# Patient Record
Sex: MALE | Race: BLACK OR AFRICAN AMERICAN | Employment: OTHER | URBAN - METROPOLITAN AREA
[De-identification: names, ages, dates, MRNs, and addresses within clinical notes are randomized per-mention and may not be internally consistent; named-entity substitution may affect disease eponyms.]

---

## 2017-05-01 ENCOUNTER — HOSPITAL ENCOUNTER (EMERGENCY)
Age: 47
Discharge: HOME | End: 2017-05-01
Attending: EMERGENCY MEDICINE | Admitting: EMERGENCY MEDICINE
Payer: MEDICAID

## 2017-05-01 ENCOUNTER — APPOINTMENT (OUTPATIENT)
Dept: GENERAL RADIOLOGY | Age: 47
End: 2017-05-01
Attending: EMERGENCY MEDICINE
Payer: MEDICAID

## 2017-05-01 VITALS
SYSTOLIC BLOOD PRESSURE: 147 MMHG | DIASTOLIC BLOOD PRESSURE: 99 MMHG | HEIGHT: 69 IN | HEART RATE: 84 BPM | BODY MASS INDEX: 23.85 KG/M2 | WEIGHT: 161 LBS | TEMPERATURE: 98.1 F | RESPIRATION RATE: 16 BRPM | OXYGEN SATURATION: 98 %

## 2017-05-01 DIAGNOSIS — S86.819A SPRAIN AND STRAIN OF OTHER SPECIFIED SITES OF KNEE AND LEG: Primary | ICD-10-CM

## 2017-05-01 DIAGNOSIS — S83.8X9A SPRAIN AND STRAIN OF OTHER SPECIFIED SITES OF KNEE AND LEG: Primary | ICD-10-CM

## 2017-05-01 PROCEDURE — 73562 X-RAY EXAM OF KNEE 3: CPT

## 2017-05-01 PROCEDURE — 99283 EMERGENCY DEPT VISIT LOW MDM: CPT

## 2017-05-01 PROCEDURE — L1830 KO IMMOB CANVAS LONG PRE OTS: HCPCS

## 2017-05-01 PROCEDURE — 74011250637 HC RX REV CODE- 250/637: Performed by: EMERGENCY MEDICINE

## 2017-05-01 RX ORDER — HYDROCODONE BITARTRATE AND ACETAMINOPHEN 5; 325 MG/1; MG/1
1 TABLET ORAL
Qty: 12 TAB | Refills: 0 | Status: SHIPPED | OUTPATIENT
Start: 2017-05-01 | End: 2017-06-02

## 2017-05-01 RX ORDER — HYDROCODONE BITARTRATE AND ACETAMINOPHEN 5; 325 MG/1; MG/1
1 TABLET ORAL
Status: COMPLETED | OUTPATIENT
Start: 2017-05-01 | End: 2017-05-01

## 2017-05-01 RX ADMIN — HYDROCODONE BITARTRATE AND ACETAMINOPHEN 1 TABLET: 5; 325 TABLET ORAL at 22:20

## 2017-05-02 NOTE — ED NOTES
Knee immobilizer applied and pt instructed on the use of crutch walking. Pt demonstrated correct use of crutch walking via teach back method. Discharge instructions given to patient by MD and nurse. Pt has been given counseling on medication use and verbalizes understanding. Pt ambulated off of unit in no signs of distress.

## 2017-05-02 NOTE — ED PROVIDER NOTES
HPI Comments: 44-year-old male presents to emergency department for evaluation of right knee pain. Patient states this morning he stood up to get out of the car. His right foot was planted and he turned to the left causing pain in his right knee. Patient has a history of knee injury as a youngster. Patient tried ibuprofen with no relief. Pain is made worse with walking and moving his knee. Pain is rated 8/10. Pain radiates up into the back of the leg. Pain described as sharp. No numbness or tingling, loss of sensation or weakness in the leg. No ankle pain or foot pain. No back pain. No alleviating factors. Social hx  +smoker  Occasional alcohol    Patient is a 41 y.o. male presenting with knee injury. The history is provided by the patient. Knee Injury    Pertinent negatives include no back pain and no neck pain. Past Medical History:   Diagnosis Date    Back pain        Past Surgical History:   Procedure Laterality Date    HX OTHER SURGICAL      Nasal bridge fracture         Family History:   Problem Relation Age of Onset    Diabetes Mother        Social History     Social History    Marital status: SINGLE     Spouse name: N/A    Number of children: N/A    Years of education: N/A     Occupational History    Not on file. Social History Main Topics    Smoking status: Current Every Day Smoker     Packs/day: 0.50    Smokeless tobacco: Never Used    Alcohol use 0.0 oz/week     0 Standard drinks or equivalent per week      Comment: Rarely    Drug use: No    Sexual activity: No     Other Topics Concern    Not on file     Social History Narrative         ALLERGIES: Tramadol and Tylenol [acetaminophen]    Review of Systems   Constitutional: Negative for chills and fever. Respiratory: Negative for cough, chest tightness and shortness of breath. Cardiovascular: Negative for chest pain. Gastrointestinal: Negative for abdominal pain, nausea and vomiting.    Musculoskeletal: Negative for back pain, neck pain and neck stiffness. Knee pain   Skin: Negative for color change and rash. Neurological: Negative for dizziness, weakness and headaches. Vitals:    05/01/17 2124 05/01/17 2301   BP: 130/83 (!) 147/99   Pulse: 92 84   Resp: 18 16   Temp: 98.6 °F (37 °C) 98.1 °F (36.7 °C)   SpO2: 99% 98%   Weight: 73 kg (161 lb)    Height: 5' 9\" (1.753 m)             Physical Exam   Constitutional: He is oriented to person, place, and time. He appears well-developed and well-nourished. HENT:   Head: Normocephalic and atraumatic. Eyes: Conjunctivae and EOM are normal. Pupils are equal, round, and reactive to light. Neck: Normal range of motion. Neck supple. Cardiovascular: Normal rate and regular rhythm. Pulmonary/Chest: Effort normal. No respiratory distress. Musculoskeletal: Normal range of motion. He exhibits tenderness. He exhibits no edema. Right Knee: no redness, warmth, swelling. No obvious deformity. Pt able to lift leg off of exam bed. Pt able to flex and extend fully. Pt tender to palpation, medial aspect of knee. small palpable effusion. Joint stable. No ligament laxity with valgus or varus stress. Anterior drawer negative. 2+ dorsalis pedis, 2+ posterior tibialis. Ankle, foot, hip, tib/fib  Non tender to palpation. Neurological: He is alert and oriented to person, place, and time. No cranial nerve deficit. He exhibits normal muscle tone. Coordination normal.   Skin: Skin is warm and dry. No rash noted. Psychiatric: He has a normal mood and affect. His behavior is normal. Judgment and thought content normal.   Nursing note and vitals reviewed. MDM  Number of Diagnoses or Management Options  Sprain and strain of other specified sites of knee and leg:   Diagnosis management comments: 45-year-old male presenting for right knee pain. The knee is not red hot or swollen. There is a small effusion. No deformity.  Neurovascularly intact  Plan: X-ray, pain medicine    11:05 PM  Xray negative for acute bony process. Will treat with ibuprofen and norco, knee immobilizer and ortho followup. Standard narcotic and sedating medication warnings given  Patient's results have been reviewed with them. Patient and/or family have verbally conveyed their understanding and agreement of the patient's signs, symptoms, diagnosis, treatment and prognosis and additionally agree to follow up as recommended or return to the Emergency Room should their condition change prior to follow-up. Discharge instructions have also been provided to the patient with some educational information regarding their diagnosis as well a list of reasons why they would want to return to the ER prior to their follow-up appointment should their condition change. Amount and/or Complexity of Data Reviewed  Discuss the patient with other providers: yes (ER attending-Gurmeet)    Patient Progress  Patient progress: stable    ED Course       Procedures    Pt case including HPI, PE, and all available lab and radiology results has been discussed with attending physician. Opportunity to evaluate patient has been provided to ER attending. Discharge and prescription plan has been agreed upon.

## 2017-05-02 NOTE — DISCHARGE INSTRUCTIONS
We hope that we have addressed all of your medical concerns. The examination and treatment you received in the Emergency Department were for an emergent problem and were not intended as complete care. It is important that you follow up with your healthcare provider(s) for ongoing care. If your symptoms worsen or do not improve as expected, and you are unable to reach your usual health care provider(s), you should return to the Emergency Department. Today's healthcare is undergoing tremendous change, and patient satisfaction surveys are one of the many tools to assess the quality of medical care. You may receive a survey from the [employer] regarding your experience in the Emergency Department. I hope that your experience has been completely positive, particularly the medical care that I provided. As such, please participate in the survey; anything less than excellent does not meet my expectations or intentions. [facility] and [address] [place] [address] participate in nationally recognized quality of care measures. If your blood pressure is greater than 120/80, as reported below, we urge that you seek medical care to address the potential of high blood pressure, commonly known as hypertension. Hypertension can be hereditary or can be caused by certain medical conditions, pain, stress, or \"white coat syndrome. \"       Please make an appointment with your health care provider(s) for follow up of your Emergency Department visit. VITALS:   Patient Vitals for the past 8 hrs:   Temp Pulse Resp BP SpO2   05/01/17 2124 98.6 °F (37 °C) 92 18 130/83 99 %          Thank you for allowing us to provide you with medical care today. We realize that you have many choices for your emergency care needs. Please choose us in the future for any continued health care needs. Cristina Navarrete, [address] [place] [address].   Office: [phone]            No results found for this or any previous visit (from the past 24 hour(s)). Xr Knee Rt 3 V    Result Date: 5/1/2017  EXAM:  XR KNEE RT 3 V INDICATION:   Right knee pain for 2 hours. COMPARISON: None. FINDINGS: Three views of the right knee demonstrate no fracture or other acute osseous or articular abnormality. There is no effusion. Bipartite patella is noted. IMPRESSION:  No acute abnormality. Knee Sprain: Care Instructions  Your Care Instructions    A knee sprain is one or more stretched, partly torn, or completely torn knee ligaments. Ligaments are bands of ropelike tissue that connect bone to bone and make the knee stable. The knee has four main ligaments. Knee sprains often happen because of a twisting or bending injury from sports such as skiing, basketball, soccer, or football. The knee turns one way while the lower or upper leg goes another way. A sprain also can happen when the knee is hit from the side or the front. If a knee ligament is slightly stretched, you will probably need only home treatment. You may need a splint or brace (immobilizer) for a partly torn ligament. A complete tear may need surgery. A minor knee sprain may take up to 6 weeks to heal, while a severe sprain may take months. Follow-up care is a key part of your treatment and safety. Be sure to make and go to all appointments, and call your doctor if you are having problems. It's also a good idea to know your test results and keep a list of the medicines you take. How can you care for yourself at home? · Follow instructions about how much weight you can put on your leg and how to walk with crutches. · Prop up your leg on a pillow when you ice it or anytime you sit or lie down for the next 3 days. Try to keep it above the level of your heart. This will help reduce swelling. · Put ice or a cold pack on your knee for 10 to 20 minutes at a time.  Try to do this every 1 to 2 hours for the next 3 days (when you are awake) or until the swelling goes down. Put a thin cloth between the ice and your skin. Do not get the splint wet. · If you have an elastic bandage, make sure it is snug but not so tight that your leg is numb, tingles, or swells below the bandage. You can loosen the bandage if it is too tight. · Your doctor may recommend a brace (immobilizer) to support your knee while it heals. Wear it as directed. · Ask your doctor if you can take an over-the-counter pain medicine, such as acetaminophen (Tylenol), ibuprofen (Advil, Motrin), or naproxen (Aleve). Be safe with medicines. Read and follow all instructions on the label. When should you call for help? Call 911 anytime you think you may need emergency care. For example, call if:  · You have sudden chest pain and shortness of breath, or you cough up blood. Call your doctor now or seek immediate medical care if:  · You have increased or severe pain. · You cannot move your toes or ankle. · Your foot is cool or pale or changes color. · You have tingling, weakness, or numbness in your foot or leg. · Your splint or brace feels too tight. · You are unable to straighten the knee, or the knee \"locks. \"  · You have signs of a blood clot in your leg, such as:  ¨ Pain in your calf, back of the knee, thigh, or groin. ¨ Redness and swelling in your leg. Watch closely for changes in your health, and be sure to contact your doctor if:  · Your pain is not getting better or is getting worse. Where can you learn more? Go to http://erum-perlita.info/. Enter N406 in the search box to learn more about \"Knee Sprain: Care Instructions. \"  Current as of: May 23, 2016  Content Version: 11.2  © 9392-4829 [employer]. Care instructions adapted under license by [employer] (which disclaims liability or warranty for this information).  If you have questions about a medical condition or this instruction, always ask your healthcare professional. [address] any warranty or liability for your use of this information.

## 2017-06-02 ENCOUNTER — HOSPITAL ENCOUNTER (EMERGENCY)
Age: 47
Discharge: HOME | End: 2017-06-02
Attending: EMERGENCY MEDICINE
Payer: MEDICAID

## 2017-06-02 VITALS
BODY MASS INDEX: 23.7 KG/M2 | WEIGHT: 160 LBS | DIASTOLIC BLOOD PRESSURE: 97 MMHG | RESPIRATION RATE: 16 BRPM | OXYGEN SATURATION: 99 % | TEMPERATURE: 97.7 F | HEIGHT: 69 IN | HEART RATE: 80 BPM | SYSTOLIC BLOOD PRESSURE: 137 MMHG

## 2017-06-02 DIAGNOSIS — I10 ESSENTIAL HYPERTENSION: ICD-10-CM

## 2017-06-02 DIAGNOSIS — J01.90 ACUTE SINUSITIS, RECURRENCE NOT SPECIFIED, UNSPECIFIED LOCATION: Primary | ICD-10-CM

## 2017-06-02 LAB
ANION GAP BLD CALC-SCNC: 8 MMOL/L (ref 5–15)
APPEARANCE UR: ABNORMAL
BACTERIA URNS QL MICRO: NEGATIVE /HPF
BASOPHILS # BLD AUTO: 0 K/UL (ref 0–0.1)
BASOPHILS # BLD: 0 % (ref 0–1)
BILIRUB UR QL CFM: NEGATIVE
BUN SERPL-MCNC: 13 MG/DL (ref 6–20)
BUN/CREAT SERPL: 15 (ref 12–20)
CALCIUM SERPL-MCNC: 8.7 MG/DL (ref 8.5–10.1)
CAOX CRY URNS QL MICRO: ABNORMAL
CHLORIDE SERPL-SCNC: 106 MMOL/L (ref 97–108)
CO2 SERPL-SCNC: 23 MMOL/L (ref 21–32)
COLOR UR: ABNORMAL
CREAT SERPL-MCNC: 0.87 MG/DL (ref 0.7–1.3)
EOSINOPHIL # BLD: 0.1 K/UL (ref 0–0.4)
EOSINOPHIL NFR BLD: 1 % (ref 0–7)
EPITH CASTS URNS QL MICRO: ABNORMAL /LPF
ERYTHROCYTE [DISTWIDTH] IN BLOOD BY AUTOMATED COUNT: 15.9 % (ref 11.5–14.5)
GLUCOSE SERPL-MCNC: 91 MG/DL (ref 65–100)
GLUCOSE UR STRIP.AUTO-MCNC: NEGATIVE MG/DL
HCT VFR BLD AUTO: 44.7 % (ref 36.6–50.3)
HGB BLD-MCNC: 15.1 G/DL (ref 12.1–17)
HGB UR QL STRIP: NEGATIVE
KETONES UR QL STRIP.AUTO: ABNORMAL MG/DL
LEUKOCYTE ESTERASE UR QL STRIP.AUTO: NEGATIVE
LYMPHOCYTES # BLD AUTO: 23 % (ref 12–49)
LYMPHOCYTES # BLD: 1.1 K/UL (ref 0.8–3.5)
MCH RBC QN AUTO: 24.6 PG (ref 26–34)
MCHC RBC AUTO-ENTMCNC: 33.8 G/DL (ref 30–36.5)
MCV RBC AUTO: 72.9 FL (ref 80–99)
MONOCYTES # BLD: 0.4 K/UL (ref 0–1)
MONOCYTES NFR BLD AUTO: 9 % (ref 5–13)
NEUTS SEG # BLD: 3.1 K/UL (ref 1.8–8)
NEUTS SEG NFR BLD AUTO: 67 % (ref 32–75)
NITRITE UR QL STRIP.AUTO: NEGATIVE
PH UR STRIP: 5.5 [PH] (ref 5–8)
PLATELET # BLD AUTO: 140 K/UL (ref 150–400)
POTASSIUM SERPL-SCNC: 3.6 MMOL/L (ref 3.5–5.1)
PROT UR STRIP-MCNC: NEGATIVE MG/DL
RBC # BLD AUTO: 6.13 M/UL (ref 4.1–5.7)
RBC #/AREA URNS HPF: ABNORMAL /HPF (ref 0–5)
SODIUM SERPL-SCNC: 137 MMOL/L (ref 136–145)
SP GR UR REFRACTOMETRY: 1.03 (ref 1–1.03)
UROBILINOGEN UR QL STRIP.AUTO: 0.2 EU/DL (ref 0.2–1)
WBC # BLD AUTO: 4.6 K/UL (ref 4.1–11.1)
WBC URNS QL MICRO: ABNORMAL /HPF (ref 0–4)

## 2017-06-02 PROCEDURE — 81003 URINALYSIS AUTO W/O SCOPE: CPT | Performed by: NURSE PRACTITIONER

## 2017-06-02 PROCEDURE — 36415 COLL VENOUS BLD VENIPUNCTURE: CPT | Performed by: NURSE PRACTITIONER

## 2017-06-02 PROCEDURE — 85025 COMPLETE CBC W/AUTO DIFF WBC: CPT | Performed by: NURSE PRACTITIONER

## 2017-06-02 PROCEDURE — 80048 BASIC METABOLIC PNL TOTAL CA: CPT | Performed by: NURSE PRACTITIONER

## 2017-06-02 PROCEDURE — 99283 EMERGENCY DEPT VISIT LOW MDM: CPT

## 2017-06-02 RX ORDER — AZITHROMYCIN 500 MG/1
500 TABLET, FILM COATED ORAL DAILY
Qty: 3 TAB | Refills: 0 | Status: SHIPPED | OUTPATIENT
Start: 2017-06-02 | End: 2017-09-06

## 2017-06-02 RX ORDER — ACETAMINOPHEN 325 MG/1
TABLET ORAL
COMMUNITY
End: 2017-09-06

## 2017-06-02 NOTE — DISCHARGE INSTRUCTIONS
We hope that we have addressed all of your medical concerns. The examination and treatment you received in the Emergency Department were for an emergent problem and were not intended as complete care. It is important that you follow up with your healthcare provider(s) for ongoing care. If your symptoms worsen or do not improve as expected, and you are unable to reach your usual health care provider(s), you should return to the Emergency Department. Today's healthcare is undergoing tremendous change, and patient satisfaction surveys are one of the many tools to assess the quality of medical care. You may receive a survey from the [employer] regarding your experience in the Emergency Department. I hope that your experience has been completely positive, particularly the medical care that I provided. As such, please participate in the survey; anything less than excellent does not meet my expectations or intentions. Pending sale to [facility] and [place] participate in nationally recognized quality of care measures. If your blood pressure is greater than 120/80, as reported below, we urge that you seek medical care to address the potential of high blood pressure, commonly known as hypertension. Hypertension can be hereditary or can be caused by certain medical conditions, pain, stress, or \"white coat syndrome. \"       Please make an appointment with your health care provider(s) for follow up of your Emergency Department visit. VITALS:   Patient Vitals for the past 8 hrs:   Temp Pulse Resp BP SpO2   06/02/17 0953 97.7 °F (36.5 °C) 80 16 (!) 137/97 99 %          Thank you for allowing us to provide you with medical care today. We realize that you have many choices for your emergency care needs. Please choose us in the future for any continued health care needs. Bernardine Sandhoff C/ [address], [address] [facility] [address] Office: [phone]            Recent Results (from the past 24 hour(s))   CBC WITH AUTOMATED DIFF    Collection Time: 06/02/17 10:21 AM   Result Value Ref Range    WBC 4.6 4.1 - 11.1 K/uL    RBC 6.13 (H) 4.10 - 5.70 M/uL    HGB 15.1 12.1 - 17.0 g/dL    HCT 44.7 36.6 - 50.3 %    MCV 72.9 (L) 80.0 - 99.0 FL    MCH 24.6 (L) 26.0 - 34.0 PG    MCHC 33.8 30.0 - 36.5 g/dL    RDW 15.9 (H) 11.5 - 14.5 %    PLATELET 654 (L) 343 - 400 K/uL    NEUTROPHILS 67 32 - 75 %    LYMPHOCYTES 23 12 - 49 %    MONOCYTES 9 5 - 13 %    EOSINOPHILS 1 0 - 7 %    BASOPHILS 0 0 - 1 %    ABS. NEUTROPHILS 3.1 1.8 - 8.0 K/UL    ABS. LYMPHOCYTES 1.1 0.8 - 3.5 K/UL    ABS. MONOCYTES 0.4 0.0 - 1.0 K/UL    ABS. EOSINOPHILS 0.1 0.0 - 0.4 K/UL    ABS. BASOPHILS 0.0 0.0 - 0.1 K/UL   URINALYSIS W/ RFLX MICROSCOPIC    Collection Time: 06/02/17 10:21 AM   Result Value Ref Range    Color DARK YELLOW      Appearance CLOUDY (A) CLEAR      Specific gravity 1.030 1.003 - 1.030      pH (UA) 5.5 5.0 - 8.0      Protein NEGATIVE  NEG mg/dL    Glucose NEGATIVE  NEG mg/dL    Ketone TRACE (A) NEG mg/dL    Blood NEGATIVE  NEG      Urobilinogen 0.2 0.2 - 1.0 EU/dL    Nitrites NEGATIVE  NEG      Leukocyte Esterase NEGATIVE  NEG     METABOLIC PANEL, BASIC    Collection Time: 06/02/17 10:21 AM   Result Value Ref Range    Sodium 137 136 - 145 mmol/L    Potassium 3.6 3.5 - 5.1 mmol/L    Chloride 106 97 - 108 mmol/L    CO2 23 21 - 32 mmol/L    Anion gap 8 5 - 15 mmol/L    Glucose 91 65 - 100 mg/dL    BUN 13 6 - 20 MG/DL    Creatinine 0.87 0.70 - 1.30 MG/DL    BUN/Creatinine ratio 15 12 - 20      GFR est AA >60 >60 ml/min/1.73m2    GFR est non-AA >60 >60 ml/min/1.73m2    Calcium 8.7 8.5 - 10.1 MG/DL   BILIRUBIN, CONFIRM    Collection Time: 06/02/17 10:21 AM   Result Value Ref Range    Bilirubin UA, confirm NEGATIVE  NEG         No results found.            High Blood Pressure: Care Instructions  Your Care Instructions  If your blood pressure is usually above 140/90, you have high blood pressure, or hypertension. That means the top number is 140 or higher or the bottom number is 90 or higher, or both. Despite what a lot of people think, high blood pressure usually doesn't cause headaches or make you feel dizzy or lightheaded. It usually has no symptoms. But it does increase your risk for heart attack, stroke, and kidney or eye damage. The higher your blood pressure, the more your risk increases. Your doctor will give you a goal for your blood pressure. Your goal will be based on your health and your age. An example of a goal is to keep your blood pressure below 140/90. Lifestyle changes, such as eating healthy and being active, are always important to help lower blood pressure. You might also take medicine to reach your blood pressure goal.  Follow-up care is a key part of your treatment and safety. Be sure to make and go to all appointments, and call your doctor if you are having problems. It's also a good idea to know your test results and keep a list of the medicines you take. How can you care for yourself at home? Medical treatment  · If you stop taking your medicine, your blood pressure will go back up. You may take one or more types of medicine to lower your blood pressure. Be safe with medicines. Take your medicine exactly as prescribed. Call your doctor if you think you are having a problem with your medicine. · Talk to your doctor before you start taking aspirin every day. Aspirin can help certain people lower their risk of a heart attack or stroke. But taking aspirin isn't right for everyone, because it can cause serious bleeding. · See your doctor regularly. You may need to see the doctor more often at first or until your blood pressure comes down. · If you are taking blood pressure medicine, talk to your doctor before you take decongestants or anti-inflammatory medicine, such as ibuprofen. Some of these medicines can raise blood pressure.   · Learn how to check your blood pressure at home. Lifestyle changes  · Stay at a healthy weight. This is especially important if you put on weight around the waist. Losing even 10 pounds can help you lower your blood pressure. · If your doctor recommends it, get more exercise. Walking is a good choice. Bit by bit, increase the amount you walk every day. Try for at least 30 minutes on most days of the week. You also may want to swim, bike, or do other activities. · Avoid or limit alcohol. Talk to your doctor about whether you can drink any alcohol. · Try to limit how much sodium you eat to less than 2,300 milligrams (mg) a day. Your doctor may ask you to try to eat less than 1,500 mg a day. · Eat plenty of fruits (such as bananas and oranges), vegetables, legumes, whole grains, and low-fat dairy products. · Lower the amount of saturated fat in your diet. Saturated fat is found in animal products such as milk, cheese, and meat. Limiting these foods may help you lose weight and also lower your risk for heart disease. · Do not smoke. Smoking increases your risk for heart attack and stroke. If you need help quitting, talk to your doctor about stop-smoking programs and medicines. These can increase your chances of quitting for good. When should you call for help? Call 911 anytime you think you may need emergency care. This may mean having symptoms that suggest that your blood pressure is causing a serious heart or blood vessel problem. Your blood pressure may be over 180/110. For example, call 911 if:  · You have symptoms of a heart attack. These may include:  ¨ Chest pain or pressure, or a strange feeling in the chest.  ¨ Sweating. ¨ Shortness of breath. ¨ Nausea or vomiting. ¨ Pain, pressure, or a strange feeling in the back, neck, jaw, or upper belly or in one or both shoulders or arms. ¨ Lightheadedness or sudden weakness. ¨ A fast or irregular heartbeat. · You have symptoms of a stroke.  These may include:  ¨ Sudden numbness, tingling, weakness, or loss of movement in your face, arm, or leg, especially on only one side of your body. ¨ Sudden vision changes. ¨ Sudden trouble speaking. ¨ Sudden confusion or trouble understanding simple statements. ¨ Sudden problems with walking or balance. ¨ A sudden, severe headache that is different from past headaches. · You have severe back or belly pain. Do not wait until your blood pressure comes down on its own. Get help right away. Call your doctor now or seek immediate care if:  · Your blood pressure is much higher than normal (such as 180/110 or higher), but you don't have symptoms. · You think high blood pressure is causing symptoms, such as:  ¨ Severe headache. ¨ Blurry vision. Watch closely for changes in your health, and be sure to contact your doctor if:  · Your blood pressure measures 140/90 or higher at least 2 times. That means the top number is 140 or higher or the bottom number is 90 or higher, or both. · You think you may be having side effects from your blood pressure medicine. · Your blood pressure is usually normal, but it goes above normal at least 2 times. Where can you learn more? Go to http://erum-perlita.info/. Enter A710 in the search box to learn more about \"High Blood Pressure: Care Instructions. \"  Current as of: August 8, 2016  Content Version: 11.2  © 4936-8374 [email or web address]. Care instructions adapted under license by [employer] (which disclaims liability or warranty for this information). If you have questions about a medical condition or this instruction, always ask your healthcare professional. [place] [address] any warranty or liability for your use of this information.
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
## 2017-06-02 NOTE — ED NOTES
PT GIVEN COPY OF DISCHARGE INSTRUCTIONS BY NP, PT VERBALIZED UNDERSTANDING, QUESTIONS ANSWERED, DISCHARGED IN STABLE CONDITION, PT DISCHARGED PRIOR TO NURSING REEVALUATION OR REASSESSMENT

## 2017-09-06 ENCOUNTER — HOSPITAL ENCOUNTER (EMERGENCY)
Age: 47
Discharge: HOME | End: 2017-09-06
Attending: STUDENT IN AN ORGANIZED HEALTH CARE EDUCATION/TRAINING PROGRAM
Payer: MEDICAID

## 2017-09-06 ENCOUNTER — APPOINTMENT (OUTPATIENT)
Dept: GENERAL RADIOLOGY | Age: 47
End: 2017-09-06
Attending: PHYSICIAN ASSISTANT
Payer: MEDICAID

## 2017-09-06 VITALS
HEIGHT: 69 IN | DIASTOLIC BLOOD PRESSURE: 89 MMHG | HEART RATE: 98 BPM | TEMPERATURE: 98 F | SYSTOLIC BLOOD PRESSURE: 138 MMHG | WEIGHT: 149.06 LBS | BODY MASS INDEX: 22.08 KG/M2 | RESPIRATION RATE: 16 BRPM | OXYGEN SATURATION: 99 %

## 2017-09-06 DIAGNOSIS — M54.42 ACUTE LEFT-SIDED LOW BACK PAIN WITH LEFT-SIDED SCIATICA: Primary | ICD-10-CM

## 2017-09-06 PROCEDURE — 72100 X-RAY EXAM L-S SPINE 2/3 VWS: CPT

## 2017-09-06 PROCEDURE — 74011250637 HC RX REV CODE- 250/637: Performed by: PHYSICIAN ASSISTANT

## 2017-09-06 PROCEDURE — 99283 EMERGENCY DEPT VISIT LOW MDM: CPT

## 2017-09-06 PROCEDURE — 96372 THER/PROPH/DIAG INJ SC/IM: CPT

## 2017-09-06 PROCEDURE — 74011250636 HC RX REV CODE- 250/636: Performed by: PHYSICIAN ASSISTANT

## 2017-09-06 RX ORDER — METHYLPREDNISOLONE 4 MG/1
TABLET ORAL
Qty: 1 DOSE PACK | Refills: 0 | Status: SHIPPED | OUTPATIENT
Start: 2017-09-06 | End: 2017-10-12

## 2017-09-06 RX ORDER — KETOROLAC TROMETHAMINE 30 MG/ML
60 INJECTION, SOLUTION INTRAMUSCULAR; INTRAVENOUS
Status: COMPLETED | OUTPATIENT
Start: 2017-09-06 | End: 2017-09-06

## 2017-09-06 RX ORDER — HYDROCODONE BITARTRATE AND ACETAMINOPHEN 5; 325 MG/1; MG/1
2 TABLET ORAL
Status: COMPLETED | OUTPATIENT
Start: 2017-09-06 | End: 2017-09-06

## 2017-09-06 RX ORDER — HYDROCODONE BITARTRATE AND ACETAMINOPHEN 5; 325 MG/1; MG/1
1 TABLET ORAL
Qty: 12 TAB | Refills: 0 | Status: SHIPPED | OUTPATIENT
Start: 2017-09-06 | End: 2017-09-21

## 2017-09-06 RX ORDER — CYCLOBENZAPRINE HCL 10 MG
10 TABLET ORAL
Qty: 15 TAB | Refills: 0 | Status: SHIPPED | OUTPATIENT
Start: 2017-09-06 | End: 2018-01-28

## 2017-09-06 RX ADMIN — KETOROLAC TROMETHAMINE 60 MG: 30 INJECTION, SOLUTION INTRAMUSCULAR at 18:43

## 2017-09-06 RX ADMIN — HYDROCODONE BITARTRATE AND ACETAMINOPHEN 2 TABLET: 5; 325 TABLET ORAL at 18:43

## 2017-09-06 NOTE — ED PROVIDER NOTES
HPI Comments: 40 y/o AA male with PMH significant for chronic low back pain presents to the ED for the evaluation of exacerbation of his low back pain with some shooting down his leg. He states about 3-4 days ago he was doing some work when he twisted wrong, pulling something in his lower back. He does note shooting pain and some numbness, tingling down his left leg. He denies any saddle anesthesia. No bowel/bladder incontinence noted. No lower extremity weakness. Worse with movement. Does not have back specialist.  No other acute medical complaints expressed at this time. The history is provided by the patient. No  was used. Past Medical History:   Diagnosis Date    Back pain        Past Surgical History:   Procedure Laterality Date    HX OTHER SURGICAL      Nasal bridge fracture         Family History:   Problem Relation Age of Onset    Diabetes Mother        Social History     Social History    Marital status: SINGLE     Spouse name: N/A    Number of children: N/A    Years of education: N/A     Occupational History    Not on file. Social History Main Topics    Smoking status: Current Every Day Smoker     Packs/day: 0.50    Smokeless tobacco: Never Used    Alcohol use 0.0 oz/week     0 Standard drinks or equivalent per week      Comment: Rarely    Drug use: No    Sexual activity: No     Other Topics Concern    Not on file     Social History Narrative         ALLERGIES: Tramadol and Tylenol [acetaminophen]    Review of Systems       Vitals:    09/06/17 1813 09/06/17 1844   BP: (!) 129/91 138/89   Pulse: (!) 114 98   Resp: 16    Temp: 98 °F (36.7 °C)    SpO2: 98% 99%   Weight: 67.6 kg (149 lb 1 oz)    Height: 5' 9\" (1.753 m)             Physical Exam   Constitutional: He is oriented to person, place, and time. He appears well-developed and well-nourished. HENT:   Head: Normocephalic and atraumatic.    Eyes: Conjunctivae and EOM are normal. Pupils are equal, round, and reactive to light. Neck: Normal range of motion. Neck supple. No midline tenderness to palpation of cspine. Cardiovascular: Normal rate, regular rhythm and normal heart sounds. Pulmonary/Chest: Effort normal and breath sounds normal. No respiratory distress. He has no wheezes. He exhibits no tenderness. Abdominal: Soft. Bowel sounds are normal. He exhibits no distension and no mass. There is no tenderness. There is no rebound and no guarding. No aortic bruits,  No femoral bruits. 2+ femoral pulses     Musculoskeletal: Normal range of motion. He exhibits tenderness. He exhibits no edema or deformity. No TS spine pain with palpation. Mild Lspine pain with palpation. No stepoffs, no deformity  No redness, rashes, warmth, or cellulitis. 5/5 flexion/extension of hips bilaterally  5/5 great toes strength bilaterally  5/5 dorsiflexion/plantarflexion bilaterally  Straight leg raise negative. No saddle anesthesia present. Walks on heels/toes. Neurological: He is oriented to person, place, and time. He has normal reflexes. No cranial nerve deficit. He exhibits normal muscle tone. Coordination normal.   Reflex Scores:       Patellar reflexes are 2+ on the right side and 2+ on the left side. Achilles reflexes are 2+ on the right side and 2+ on the left side. No clonus/babinski    Skin: Skin is warm and dry. No rash noted. No erythema. Psychiatric: He has a normal mood and affect. His behavior is normal. Judgment and thought content normal.   Nursing note and vitals reviewed. [facility]  ED Course       Procedures    Patient with exacerbation of chronic back pain with some radiculopathy  No signs of any neuro deficitis. Strong femoral and pedal pulses.   No pulsatile abdominal mass  xr findings show DDD L4-S1  Pain is MSK in nature  Reviewed  and only has filled 2 narcotics in the past 12 months  Will d/c home with norco, flexeril and medrol taco  Ortho spine follow up recommended for further evaluation  Patient verbalizes understanding of dx and is aware of what s/sx to monitor that would warrant return visit to ED  Discussed with Dr. Chin Cortez

## 2017-09-06 NOTE — DISCHARGE INSTRUCTIONS
Back Pain: Care Instructions  Your Care Instructions    Back pain has many possible causes. It is often related to problems with muscles and ligaments of the back. It may also be related to problems with the nerves, discs, or bones of the back. Moving, lifting, standing, sitting, or sleeping in an awkward way can strain the back. Sometimes you don't notice the injury until later. Arthritis is another common cause of back pain. Although it may hurt a lot, back pain usually improves on its own within several weeks. Most people recover in 12 weeks or less. Using good home treatment and being careful not to stress your back can help you feel better sooner. Follow-up care is a key part of your treatment and safety. Be sure to make and go to all appointments, and call your doctor if you are having problems. Its also a good idea to know your test results and keep a list of the medicines you take. How can you care for yourself at home? · Sit or lie in positions that are most comfortable and reduce your pain. Try one of these positions when you lie down:  ¨ Lie on your back with your knees bent and supported by large pillows. ¨ Lie on the floor with your legs on the seat of a sofa or chair. [place]  on your side with your knees and hips bent and a pillow between your legs. ¨ Lie on your stomach if it does not make pain worse. · Do not sit up in bed, and avoid soft couches and twisted positions. Bed rest can help relieve pain at first, but it delays healing. Avoid bed rest after the first day of back pain. · Change positions every 30 minutes. If you must sit for long periods of time, take breaks from sitting. Get up and walk around, or lie in a comfortable position. · Try using a heating pad on a low or medium setting for 15 to 20 minutes every 2 or 3 hours. Try a warm shower in place of one session with the heating pad. · You can also try an ice pack for 10 to 15 minutes every 2 to 3 hours.  Put a thin cloth between the ice pack and your skin. · Take pain medicines exactly as directed. ¨ If the doctor gave you a prescription medicine for pain, take it as prescribed. ¨ If you are not taking a prescription pain medicine, ask your doctor if you can take an over-the-counter medicine. · Take short walks several times a day. You can start with 5 to 10 minutes, 3 or 4 times a day, and work up to longer walks. Walk on level surfaces and avoid hills and stairs until your back is better. · Return to work and other activities as soon as you can. Continued rest without activity is usually not good for your back. · To prevent future back pain, do exercises to stretch and strengthen your back and stomach. Learn how to use good posture, safe lifting techniques, and proper body mechanics. When should you call for help? Call your doctor now or seek immediate medical care if:  · You have new or worsening numbness in your legs. · You have new or worsening weakness in your legs. (This could make it hard to stand up.)  · You lose control of your bladder or bowels. Watch closely for changes in your health, and be sure to contact your doctor if:  · Your pain gets worse. · You are not getting better after 2 weeks. Where can you learn more? Go to http://erum-perlita.info/. Enter S629 in the search box to learn more about \"Back Pain: Care Instructions. \"  Current as of: March 21, 2017  Content Version: 11.3  © 9972-2119 [employer]. Care instructions adapted under license by [employer] (which disclaims liability or warranty for this information). If you have questions about a medical condition or this instruction, always ask your healthcare professional. [address] any warranty or liability for your use of this information.

## 2017-09-06 NOTE — ED TRIAGE NOTES
Lower back pain, shooting down left leg, numbness and pain and \"everything\", pain x 3 days, pt stated he twisted yesterday and heard something rip, hx of back problems, did not take anything for pain today , denies incontinence, pt texting on phone while in triage. ........................................................................... Owen Chiu

## 2017-09-09 ENCOUNTER — HOSPITAL ENCOUNTER (EMERGENCY)
Age: 47
Discharge: HOME | End: 2017-09-09
Attending: EMERGENCY MEDICINE
Payer: MEDICAID

## 2017-09-09 VITALS
OXYGEN SATURATION: 99 % | WEIGHT: 154 LBS | SYSTOLIC BLOOD PRESSURE: 132 MMHG | HEIGHT: 69 IN | BODY MASS INDEX: 22.81 KG/M2 | RESPIRATION RATE: 16 BRPM | TEMPERATURE: 98.1 F | DIASTOLIC BLOOD PRESSURE: 96 MMHG | HEART RATE: 98 BPM

## 2017-09-09 DIAGNOSIS — M54.42 ACUTE LEFT-SIDED LOW BACK PAIN WITH LEFT-SIDED SCIATICA: Primary | ICD-10-CM

## 2017-09-09 PROCEDURE — 74011250637 HC RX REV CODE- 250/637: Performed by: PHYSICIAN ASSISTANT

## 2017-09-09 PROCEDURE — 99283 EMERGENCY DEPT VISIT LOW MDM: CPT

## 2017-09-09 RX ORDER — DIAZEPAM 2 MG/1
2 TABLET ORAL
Qty: 21 TAB | Refills: 0 | Status: SHIPPED | OUTPATIENT
Start: 2017-09-09 | End: 2017-09-16

## 2017-09-09 RX ORDER — LIDOCAINE 50 MG/G
1 PATCH TOPICAL
Status: DISCONTINUED | OUTPATIENT
Start: 2017-09-09 | End: 2017-09-09 | Stop reason: HOSPADM

## 2017-09-09 NOTE — ED TRIAGE NOTES
Lower back pain radiating into left leg onset 2 weeks ago. \"I was seen here and given medication but am out of the pills and they weren't helping, anyway\".

## 2017-09-09 NOTE — ED PROVIDER NOTES
HPI Comments: 41 y.o. male with no significant past medical history presents with complaints of left sided low back pain which radiates down his left leg into his left foot x 2 weeks. The pt states that bending over makes the pain worse. The pt rates the pain as a 6/10 in severity. The pt describes the pain as sharp and intermittent. The pt denies taking anything at home for the discomfort. There are no other acute medical complaints at this time. PCP: MD Bello Avila PA-C    Patient is a 41 y.o. male presenting with back pain. Back Pain    Pertinent negatives include no chest pain, no fever, no numbness, no abdominal pain and no dysuria. Past Medical History:   Diagnosis Date    Back pain        Past Surgical History:   Procedure Laterality Date    HX OTHER SURGICAL      Nasal bridge fracture         Family History:   Problem Relation Age of Onset    Diabetes Mother        Social History     Social History    Marital status: SINGLE     Spouse name: N/A    Number of children: N/A    Years of education: N/A     Occupational History    Not on file. Social History Main Topics    Smoking status: Current Every Day Smoker     Packs/day: 0.50    Smokeless tobacco: Never Used    Alcohol use 0.0 oz/week     0 Standard drinks or equivalent per week      Comment: Rarely    Drug use: No    Sexual activity: No     Other Topics Concern    Not on file     Social History Narrative         ALLERGIES: Tramadol and Tylenol [acetaminophen]    Review of Systems   Constitutional: Negative for activity change, appetite change, diaphoresis and fever. HENT: Negative for ear discharge, ear pain, facial swelling, rhinorrhea, sore throat, tinnitus, trouble swallowing and voice change. Eyes: Negative for photophobia, pain, discharge, redness and visual disturbance. Respiratory: Negative for cough, chest tightness, shortness of breath, wheezing and stridor.     Cardiovascular: Negative for chest pain and palpitations. Gastrointestinal: Negative for abdominal pain, constipation, diarrhea, nausea and vomiting. Endocrine: Negative for polydipsia and polyuria. Genitourinary: Negative for dysuria, flank pain and hematuria. Musculoskeletal: Positive for back pain. Negative for arthralgias and myalgias. Skin: Negative for color change and rash. Neurological: Negative for dizziness, syncope, speech difficulty, light-headedness and numbness. Psychiatric/Behavioral: Negative for behavioral problems. Vitals:    09/09/17 1222   BP: (!) 132/96   Pulse: 98   Resp: 16   Temp: 98.1 °F (36.7 °C)   SpO2: 99%   Weight: 69.9 kg (154 lb)   Height: 5' 9\" (1.753 m)            Physical Exam   Constitutional: He is oriented to person, place, and time. He appears well-developed and well-nourished. No distress. HENT:   Head: Normocephalic and atraumatic. Eyes: Conjunctivae are normal. Pupils are equal, round, and reactive to light. Neck: Normal range of motion. Neck supple. Cardiovascular: Normal rate, regular rhythm and normal heart sounds. Pulmonary/Chest: Effort normal and breath sounds normal. No respiratory distress. He has no wheezes. Abdominal: Soft. Bowel sounds are normal. He exhibits no distension. There is no tenderness. Musculoskeletal: Normal range of motion. No C, T, L, S spine tenderness. Pt has full mobility of upper and lower extremities. Pt is able to ambulate without difficulty. No perineal numbness. Pt is NVI. Neurological: He is alert and oriented to person, place, and time. Skin: Skin is warm. He is not diaphoretic. MDM  Number of Diagnoses or Management Options  Acute left-sided low back pain with left-sided sciatica:   Diagnosis management comments: Pt presents with sciatica like symptoms. No loss of bowel/bladder continence, perineal numbness, hx of IV drug use, fever, weight loss, hx of CA, or trauma.   Will treat with prednisone and muscle relaxer and advise close follow up with ortho spine surgeon. Reviewed treatment plan with attending and they agree.   Hayden Noble PA-C    ED Course       Procedures

## 2017-09-09 NOTE — DISCHARGE INSTRUCTIONS
Back Pain: Care Instructions  Your Care Instructions    Back pain has many possible causes. It is often related to problems with muscles and ligaments of the back. It may also be related to problems with the nerves, discs, or bones of the back. Moving, lifting, standing, sitting, or sleeping in an awkward way can strain the back. Sometimes you don't notice the injury until later. Arthritis is another common cause of back pain. Although it may hurt a lot, back pain usually improves on its own within several weeks. Most people recover in 12 weeks or less. Using good home treatment and being careful not to stress your back can help you feel better sooner. Follow-up care is a key part of your treatment and safety. Be sure to make and go to all appointments, and call your doctor if you are having problems. Its also a good idea to know your test results and keep a list of the medicines you take. How can you care for yourself at home? · Sit or lie in positions that are most comfortable and reduce your pain. Try one of these positions when you lie down:  ¨ Lie on your back with your knees bent and supported by large pillows. ¨ Lie on the floor with your legs on the seat of a sofa or chair. [place] on your side with your knees and hips bent and a pillow between your legs. ¨ Lie on your stomach if it does not make pain worse. · Do not sit up in bed, and avoid soft couches and twisted positions. Bed rest can help relieve pain at first, but it delays healing. Avoid bed rest after the first day of back pain. · Change positions every 30 minutes. If you must sit for long periods of time, take breaks from sitting. Get up and walk around, or lie in a comfortable position. · Try using a heating pad on a low or medium setting for 15 to 20 minutes every 2 or 3 hours. Try a warm shower in place of one session with the heating pad. · You can also try an ice pack for 10 to 15 minutes every 2 to 3 hours.  Put a thin cloth between the ice pack and your skin. · Take pain medicines exactly as directed. ¨ If the doctor gave you a prescription medicine for pain, take it as prescribed. ¨ If you are not taking a prescription pain medicine, ask your doctor if you can take an over-the-counter medicine. · Take short walks several times a day. You can start with 5 to 10 minutes, 3 or 4 times a day, and work up to longer walks. Walk on level surfaces and avoid hills and stairs until your back is better. · Return to work and other activities as soon as you can. Continued rest without activity is usually not good for your back. · To prevent future back pain, do exercises to stretch and strengthen your back and stomach. Learn how to use good posture, safe lifting techniques, and proper body mechanics. When should you call for help? Call your doctor now or seek immediate medical care if:  · You have new or worsening numbness in your legs. · You have new or worsening weakness in your legs. (This could make it hard to stand up.)  · You lose control of your bladder or bowels. Watch closely for changes in your health, and be sure to contact your doctor if:  · Your pain gets worse. · You are not getting better after 2 weeks. Where can you learn more? Go to http://erum-perlita.info/. Enter Q163 in the search box to learn more about \"Back Pain: Care Instructions. \"  Current as of: March 21, 2017  Content Version: 11.3  © 8368-7489 [employer]. Care instructions adapted under license by [employer] (which disclaims liability or warranty for this information). If you have questions about a medical condition or this instruction, always ask your healthcare professional. [address] any warranty or liability for your use of this information. We hope that we have addressed all of your medical concerns.  The examination and treatment you received in the Emergency Department were for an emergent problem and were not intended as complete care. It is important that you follow up with your healthcare provider(s) for ongoing care. If your symptoms worsen or do not improve as expected, and you are unable to reach your usual health care provider(s), you should return to the Emergency Department. Today's healthcare is undergoing tremendous change, and patient satisfaction surveys are one of the many tools to assess the quality of medical care. You may receive a survey from the [employer] regarding your experience in the Emergency Department. I hope that your experience has been completely positive, particularly the medical care that I provided. As such, please participate in the survey; anything less than excellent does not meet my expectations or intentions. [facility] and [facility] participate in nationally recognized quality of care measures. If your blood pressure is greater than 120/80, as reported below, we urge that you seek medical care to address the potential of high blood pressure, commonly known as hypertension. Hypertension can be hereditary or can be caused by certain medical conditions, pain, stress, or \"white coat syndrome. \"       Please make an appointment with your health care provider(s) for follow up of your Emergency Department visit. VITALS:   Patient Vitals for the past 8 hrs:   Temp Pulse Resp BP SpO2   09/09/17 1222 98.1 °F (36.7 °C) 98 16 (!) 132/96 99 %          Thank you for allowing us to provide you with medical care today. We realize that you have many choices for your emergency care needs. Please choose us in the future for any continued health care needs. [facility], [address] [facility] [place]: [phone]            No results found for this or any previous visit (from the past 24 hour(s)). No results found.

## 2017-09-21 ENCOUNTER — HOSPITAL ENCOUNTER (EMERGENCY)
Age: 47
Discharge: HOME | End: 2017-09-21
Attending: EMERGENCY MEDICINE
Payer: MEDICAID

## 2017-09-21 VITALS
HEIGHT: 69 IN | SYSTOLIC BLOOD PRESSURE: 145 MMHG | BODY MASS INDEX: 24.44 KG/M2 | TEMPERATURE: 98.2 F | RESPIRATION RATE: 18 BRPM | WEIGHT: 165 LBS | DIASTOLIC BLOOD PRESSURE: 75 MMHG | HEART RATE: 88 BPM | OXYGEN SATURATION: 99 %

## 2017-09-21 DIAGNOSIS — M54.42 LOW BACK PAIN WITH LEFT-SIDED SCIATICA, UNSPECIFIED BACK PAIN LATERALITY, UNSPECIFIED CHRONICITY: Primary | ICD-10-CM

## 2017-09-21 PROCEDURE — 99283 EMERGENCY DEPT VISIT LOW MDM: CPT

## 2017-09-21 PROCEDURE — 74011250637 HC RX REV CODE- 250/637: Performed by: PHYSICIAN ASSISTANT

## 2017-09-21 PROCEDURE — 74011250636 HC RX REV CODE- 250/636: Performed by: PHYSICIAN ASSISTANT

## 2017-09-21 PROCEDURE — 96372 THER/PROPH/DIAG INJ SC/IM: CPT

## 2017-09-21 RX ORDER — NAPROXEN 500 MG/1
500 TABLET ORAL
Qty: 20 TAB | Refills: 0 | Status: SHIPPED | OUTPATIENT
Start: 2017-09-21 | End: 2017-10-12

## 2017-09-21 RX ORDER — HYDROCODONE BITARTRATE AND ACETAMINOPHEN 7.5; 325 MG/1; MG/1
1 TABLET ORAL
Status: COMPLETED | OUTPATIENT
Start: 2017-09-21 | End: 2017-09-21

## 2017-09-21 RX ORDER — KETOROLAC TROMETHAMINE 30 MG/ML
30 INJECTION, SOLUTION INTRAMUSCULAR; INTRAVENOUS
Status: COMPLETED | OUTPATIENT
Start: 2017-09-21 | End: 2017-09-21

## 2017-09-21 RX ORDER — DIAZEPAM 10 MG/2ML
5 INJECTION INTRAMUSCULAR
Status: COMPLETED | OUTPATIENT
Start: 2017-09-21 | End: 2017-09-21

## 2017-09-21 RX ORDER — HYDROCODONE BITARTRATE AND ACETAMINOPHEN 5; 325 MG/1; MG/1
1 TABLET ORAL
Qty: 12 TAB | Refills: 0 | Status: SHIPPED | OUTPATIENT
Start: 2017-09-21 | End: 2018-01-28

## 2017-09-21 RX ADMIN — DIAZEPAM 5 MG: 5 INJECTION, SOLUTION INTRAMUSCULAR; INTRAVENOUS at 22:05

## 2017-09-21 RX ADMIN — HYDROCODONE BITARTRATE AND ACETAMINOPHEN 1 TABLET: 7.5; 325 TABLET ORAL at 22:05

## 2017-09-21 RX ADMIN — KETOROLAC TROMETHAMINE 30 MG: 30 INJECTION, SOLUTION INTRAMUSCULAR at 22:05

## 2017-09-21 NOTE — ED TRIAGE NOTES
Pt arrives with back pain x 3 weeks. Recently diagnosed with \"pinched nerve\". Reports that he is scheduled to have MRI on next Friday for this. Also reports having chills and sweats & numbness to the L leg from buttocks down. Afebrile in triage. VSS.

## 2017-09-22 ENCOUNTER — PATIENT OUTREACH (OUTPATIENT)
Dept: INTERNAL MEDICINE CLINIC | Age: 47
End: 2017-09-22

## 2017-09-22 NOTE — ED PROVIDER NOTES
HPI Comments: 42 yo male with hx of back pain here for evaluation of back pain. States he has been dx with sciatica and is being followed by Ortho Dr Emerita Hyman. Has MRI scheduled next Friday. States he continues with pain to left buttocks that travels down left leg. Has RX for steroid pack which he plans to fill once paid tomorrow. Linda loss of bowel or bladder; no weakness. Denies new injury since last imaging. Linda fever, chills, CP, SOB, abd pain, flank pain, urinary symptoms. +Smoker. Patient is a 41 y.o. male presenting with back pain. The history is provided by the patient. Back Pain    This is a recurrent problem. The pain is associated with no known injury. The pain is present in the left side. The quality of the pain is described as stabbing, aching and similar to previous episodes. The pain radiates to the left thigh. The pain is at a severity of 9/10. The pain is moderate. The symptoms are aggravated by certain positions. Pertinent negatives include no chest pain, no fever, no numbness, no headaches, no abdominal pain, no bowel incontinence, no perianal numbness, no dysuria, no paresthesias, no paresis, no tingling and no weakness. He has tried NSAIDs, analgesics and muscle relaxants for the symptoms. Past Medical History:   Diagnosis Date    Back pain        Past Surgical History:   Procedure Laterality Date    HX OTHER SURGICAL      Nasal bridge fracture         Family History:   Problem Relation Age of Onset    Diabetes Mother        Social History     Social History    Marital status: SINGLE     Spouse name: N/A    Number of children: N/A    Years of education: N/A     Occupational History    Not on file.      Social History Main Topics    Smoking status: Current Every Day Smoker     Packs/day: 0.50    Smokeless tobacco: Never Used    Alcohol use 0.0 oz/week     0 Standard drinks or equivalent per week      Comment: Rarely    Drug use: No    Sexual activity: No Other Topics Concern    Not on file     Social History Narrative         ALLERGIES: Tramadol and Tylenol [acetaminophen]    Review of Systems   Constitutional: Negative. Negative for fever. HENT: Negative for ear discharge. Eyes: Negative for photophobia, pain, discharge and visual disturbance. Respiratory: Negative for apnea, cough, chest tightness and shortness of breath. Cardiovascular: Negative for chest pain, palpitations and leg swelling. Gastrointestinal: Negative for abdominal distention, abdominal pain, blood in stool and bowel incontinence. Genitourinary: Negative for difficulty urinating, dysuria, flank pain, frequency and hematuria. Musculoskeletal: Positive for back pain and myalgias. Negative for gait problem, joint swelling and neck pain. Skin: Negative for color change and pallor. Neurological: Negative for dizziness, tingling, syncope, weakness, numbness, headaches and paresthesias. Psychiatric/Behavioral: Negative for behavioral problems and confusion. The patient is not nervous/anxious. Vitals:    09/21/17 1957   BP: (!) 151/91   Pulse: (!) 101   Resp: 18   Temp: 98.6 °F (37 °C)   SpO2: 98%   Weight: 74.8 kg (165 lb)   Height: 5' 9\" (1.753 m)            Physical Exam   Constitutional: He is oriented to person, place, and time. He appears well-developed and well-nourished. HENT:   Head: Normocephalic and atraumatic. Right Ear: External ear normal.   Left Ear: External ear normal.   Nose: Nose normal.   Mouth/Throat: Oropharynx is clear and moist.   Eyes: Conjunctivae and EOM are normal. Pupils are equal, round, and reactive to light. Right eye exhibits no discharge. Left eye exhibits no discharge. Neck: Normal range of motion. Neck supple. Cardiovascular: Normal rate, regular rhythm, normal heart sounds and intact distal pulses. Pulmonary/Chest: Effort normal and breath sounds normal.   Abdominal: Soft.  Bowel sounds are normal. He exhibits no distension. There is no tenderness. There is no rebound and no guarding. Musculoskeletal: Normal range of motion. He exhibits tenderness. He exhibits no edema. Lumbar back: He exhibits tenderness. He exhibits normal range of motion, no bony tenderness, no swelling and no edema. Back:    Neurological: He is alert and oriented to person, place, and time. He has normal strength. He is not disoriented. No cranial nerve deficit or sensory deficit. Coordination normal.   5+ strength lower legs bilaterally. Skin: Skin is warm and dry. No rash noted. Psychiatric: He has a normal mood and affect. His behavior is normal. Judgment and thought content normal.   Nursing note and vitals reviewed. MDM  Number of Diagnoses or Management Options  Low back pain with left-sided sciatica, unspecified back pain laterality, unspecified chronicity:   Diagnosis management comments: 40 yo male with left lower back pain traveling down leg; hx of same and MRI scheduled; normal neuro exam with no loss of bowel/bladder; will treat symptoms with Ortho followup. Return if change or worsening of symptoms. ALEAH Marrero         Amount and/or Complexity of Data Reviewed  Tests in the radiology section of CPT®: reviewed      ED Course       Procedures    9:48 PM  Patient's results have been reviewed with them. Patient and/or family have verbally conveyed their understanding and agreement of the patient's signs, symptoms, diagnosis, treatment and prognosis and additionally agree to follow up as recommended or return to the Emergency Room should their condition change prior to follow-up. Discharge instructions have also been provided to the patient with some educational information regarding their diagnosis as well a list of reasons why they would want to return to the ER prior to their follow-up appointment should their condition change.   ALEAH Marrero

## 2017-09-22 NOTE — DISCHARGE INSTRUCTIONS
Sciatica: Exercises  Your Care Instructions  Here are some examples of typical rehabilitation exercises for your condition. Start each exercise slowly. Ease off the exercise if you start to have pain. Your doctor or physical therapist will tell you when you can start these exercises and which ones will work best for you. When you are not being active, find a comfortable position for rest. Some people are comfortable on the floor or a medium-firm bed with a small pillow under their head and another under their knees. Some people prefer to lie on their side with a pillow between their knees. Don't stay in one position for too long. Take short walks (10 to 20 minutes) every 2 to 3 hours. Avoid slopes, hills, and stairs until you feel better. Walk only distances you can manage without pain, especially leg pain. How to do the exercises  Back stretches    1. Get down on your hands and knees on the floor. 2. Relax your head and allow it to droop. Round your back up toward the ceiling until you feel a nice stretch in your upper, middle, and lower back. Hold this stretch for as long as it feels comfortable, or about 15 to 30 seconds. 3. Return to the starting position with a flat back while you are on your hands and knees. 4. Let your back sway by pressing your stomach toward the floor. Lift your buttocks toward the ceiling. 5. Hold this position for 15 to 30 seconds. 6. Repeat 2 to 4 times. Follow-up care is a key part of your treatment and safety. Be sure to make and go to all appointments, and call your doctor if you are having problems. It's also a good idea to know your test results and keep a list of the medicines you take. Where can you learn more? Go to http://erum-perlita.info/. Enter L592 in the search box to learn more about \"Sciatica: Exercises. \"  Current as of: March 21, 2017  Content Version: 11.3  © 2782-6731 [employer].  Care instructions adapted under license by Jacob Escudero (which disclaims liability or warranty for this information). If you have questions about a medical condition or this instruction, always ask your healthcare professional. [address] any warranty or liability for your use of this information. We hope that we have addressed all of your medical concerns. The examination and treatment you received in the Emergency Department were for an emergent problem and were not intended as complete care. It is important that you follow up with your healthcare provider(s) for ongoing care. If your symptoms worsen or do not improve as expected, and you are unable to reach your usual health care provider(s), you should return to the Emergency Department. Today's healthcare is undergoing tremendous change, and patient satisfaction surveys are one of the many tools to assess the quality of medical care. You may receive a survey from the [employer] regarding your experience in the Emergency Department. I hope that your experience has been completely positive, particularly the medical care that I provided. As such, please participate in the survey; anything less than excellent does not meet my expectations or intentions. [facility] and [address] [place] [address] participate in nationally recognized quality of care measures. If your blood pressure is greater than 120/80, as reported below, we urge that you seek medical care to address the potential of high blood pressure, commonly known as hypertension. Hypertension can be hereditary or can be caused by certain medical conditions, pain, stress, or \"white coat syndrome. \"       Please make an appointment with your health care provider(s) for follow up of your Emergency Department visit.        VITALS:   Patient Vitals for the past 8 hrs:   Temp Pulse Resp BP SpO2   09/21/17 1957 98.6 °F (37 °C) (!) 101 18 (!) 151/91 98 % Thank you for allowing us to provide you with medical care today. We realize that you have many choices for your emergency care needs. Please choose us in the future for any continued health care needs. Lizbeth Noe, [address] [place] [address].   Office: [phone]

## 2017-09-22 NOTE — PROGRESS NOTES
NNTOC-ED  9/22/2017    [facility] for complaint of back pain. Noted visits to ED on 12/29/16, 5/1/17, 6/2/17, 9/6/17, & 9/9/17. Patient last seen in this office on 12/10/15. Attempted to contact patient by phone to confirm that he still wishes to use Dr. Irene Ariza as his PCP. No answer to number listed. Will send letter.

## 2017-09-22 NOTE — ED NOTES
PA reviewed discharge instructions with the patient. The patient verbalized understanding. Patient ambualted out of ED by himself. No complaints or concerns noted.

## 2017-10-12 ENCOUNTER — HOSPITAL ENCOUNTER (EMERGENCY)
Age: 47
Discharge: HOME | End: 2017-10-12
Attending: EMERGENCY MEDICINE | Admitting: EMERGENCY MEDICINE
Payer: MEDICAID

## 2017-10-12 VITALS
HEIGHT: 69 IN | TEMPERATURE: 98.5 F | HEART RATE: 87 BPM | BODY MASS INDEX: 20.41 KG/M2 | SYSTOLIC BLOOD PRESSURE: 157 MMHG | WEIGHT: 137.8 LBS | RESPIRATION RATE: 20 BRPM | DIASTOLIC BLOOD PRESSURE: 98 MMHG | OXYGEN SATURATION: 98 %

## 2017-10-12 DIAGNOSIS — G89.29 CHRONIC LOW BACK PAIN WITH LEFT-SIDED SCIATICA, UNSPECIFIED BACK PAIN LATERALITY: Primary | ICD-10-CM

## 2017-10-12 DIAGNOSIS — M54.42 CHRONIC LOW BACK PAIN WITH LEFT-SIDED SCIATICA, UNSPECIFIED BACK PAIN LATERALITY: Primary | ICD-10-CM

## 2017-10-12 PROCEDURE — 96372 THER/PROPH/DIAG INJ SC/IM: CPT

## 2017-10-12 PROCEDURE — 74011250636 HC RX REV CODE- 250/636: Performed by: PHYSICIAN ASSISTANT

## 2017-10-12 PROCEDURE — 74011250637 HC RX REV CODE- 250/637: Performed by: PHYSICIAN ASSISTANT

## 2017-10-12 PROCEDURE — 99283 EMERGENCY DEPT VISIT LOW MDM: CPT

## 2017-10-12 RX ORDER — DIAZEPAM 10 MG/2ML
INJECTION INTRAMUSCULAR
Status: DISCONTINUED
Start: 2017-10-12 | End: 2017-10-12 | Stop reason: HOSPADM

## 2017-10-12 RX ORDER — DIAZEPAM 10 MG/2ML
5 INJECTION INTRAMUSCULAR
Status: COMPLETED | OUTPATIENT
Start: 2017-10-12 | End: 2017-10-12

## 2017-10-12 RX ORDER — NAPROXEN 500 MG/1
500 TABLET ORAL
Qty: 20 TAB | Refills: 0 | Status: SHIPPED | OUTPATIENT
Start: 2017-10-12 | End: 2018-01-28

## 2017-10-12 RX ORDER — METHYLPREDNISOLONE 4 MG/1
TABLET ORAL
Qty: 1 DOSE PACK | Refills: 0 | Status: SHIPPED | OUTPATIENT
Start: 2017-10-12 | End: 2018-01-28

## 2017-10-12 RX ORDER — DIAZEPAM 5 MG/1
5 TABLET ORAL
Qty: 15 TAB | Refills: 0 | Status: SHIPPED | OUTPATIENT
Start: 2017-10-12 | End: 2018-01-28

## 2017-10-12 RX ORDER — HYDROCODONE BITARTRATE AND ACETAMINOPHEN 7.5; 325 MG/1; MG/1
1 TABLET ORAL
Status: COMPLETED | OUTPATIENT
Start: 2017-10-12 | End: 2017-10-12

## 2017-10-12 RX ORDER — HYDROCODONE BITARTRATE AND ACETAMINOPHEN 7.5; 325 MG/1; MG/1
TABLET ORAL
Status: DISCONTINUED
Start: 2017-10-12 | End: 2017-10-12 | Stop reason: HOSPADM

## 2017-10-12 RX ORDER — KETOROLAC TROMETHAMINE 30 MG/ML
INJECTION, SOLUTION INTRAMUSCULAR; INTRAVENOUS
Status: DISCONTINUED
Start: 2017-10-12 | End: 2017-10-12 | Stop reason: HOSPADM

## 2017-10-12 RX ORDER — KETOROLAC TROMETHAMINE 30 MG/ML
60 INJECTION, SOLUTION INTRAMUSCULAR; INTRAVENOUS
Status: COMPLETED | OUTPATIENT
Start: 2017-10-12 | End: 2017-10-12

## 2017-10-12 RX ADMIN — DIAZEPAM 5 MG: 5 INJECTION, SOLUTION INTRAMUSCULAR; INTRAVENOUS at 01:26

## 2017-10-12 RX ADMIN — KETOROLAC TROMETHAMINE 60 MG: 30 INJECTION, SOLUTION INTRAMUSCULAR at 01:22

## 2017-10-12 RX ADMIN — HYDROCODONE BITARTRATE AND ACETAMINOPHEN 1 TABLET: 7.5; 325 TABLET ORAL at 01:22

## 2017-10-12 NOTE — DISCHARGE INSTRUCTIONS
Back Pain: Care Instructions  Your Care Instructions    Back pain has many possible causes. It is often related to problems with muscles and ligaments of the back. It may also be related to problems with the nerves, discs, or bones of the back. Moving, lifting, standing, sitting, or sleeping in an awkward way can strain the back. Sometimes you don't notice the injury until later. Arthritis is another common cause of back pain. Although it may hurt a lot, back pain usually improves on its own within several weeks. Most people recover in 12 weeks or less. Using good home treatment and being careful not to stress your back can help you feel better sooner. Follow-up care is a key part of your treatment and safety. Be sure to make and go to all appointments, and call your doctor if you are having problems. Its also a good idea to know your test results and keep a list of the medicines you take. How can you care for yourself at home? · Sit or lie in positions that are most comfortable and reduce your pain. Try one of these positions when you lie down:  ¨ Lie on your back with your knees bent and supported by large pillows. ¨ Lie on the floor with your legs on the seat of a sofa or chair. Mary Ellen Carmina on your side with your knees and hips bent and a pillow between your legs. ¨ Lie on your stomach if it does not make pain worse. · Do not sit up in bed, and avoid soft couches and twisted positions. Bed rest can help relieve pain at first, but it delays healing. Avoid bed rest after the first day of back pain. · Change positions every 30 minutes. If you must sit for long periods of time, take breaks from sitting. Get up and walk around, or lie in a comfortable position. · Try using a heating pad on a low or medium setting for 15 to 20 minutes every 2 or 3 hours. Try a warm shower in place of one session with the heating pad. · You can also try an ice pack for 10 to 15 minutes every 2 to 3 hours.  Put a thin cloth between the ice pack and your skin. · Take pain medicines exactly as directed. ¨ If the doctor gave you a prescription medicine for pain, take it as prescribed. ¨ If you are not taking a prescription pain medicine, ask your doctor if you can take an over-the-counter medicine. · Take short walks several times a day. You can start with 5 to 10 minutes, 3 or 4 times a day, and work up to longer walks. Walk on level surfaces and avoid hills and stairs until your back is better. · Return to work and other activities as soon as you can. Continued rest without activity is usually not good for your back. · To prevent future back pain, do exercises to stretch and strengthen your back and stomach. Learn how to use good posture, safe lifting techniques, and proper body mechanics. When should you call for help? Call your doctor now or seek immediate medical care if:  · You have new or worsening numbness in your legs. · You have new or worsening weakness in your legs. (This could make it hard to stand up.)  · You lose control of your bladder or bowels. Watch closely for changes in your health, and be sure to contact your doctor if:  · Your pain gets worse. · You are not getting better after 2 weeks. Where can you learn more? Go to http://erum-perlita.info/. Enter V470 in the search box to learn more about \"Back Pain: Care Instructions. \"  Current as of: March 21, 2017  Content Version: 11.3  © 9670-6647 [employer]. Care instructions adapted under license by [employer] (which disclaims liability or warranty for this information). If you have questions about a medical condition or this instruction, always ask your healthcare professional. [address] any warranty or liability for your use of this information. We hope that we have addressed all of your medical concerns.  The examination and treatment you received in the Emergency Department were for an emergent problem and were not intended as complete care. It is important that you follow up with your healthcare provider(s) for ongoing care. If your symptoms worsen or do not improve as expected, and you are unable to reach your usual health care provider(s), you should return to the Emergency Department. Today's healthcare is undergoing tremendous change, and patient satisfaction surveys are one of the many tools to assess the quality of medical care. You may receive a survey from the [employer] organization regarding your experience in the Emergency Department. I hope that your experience has been completely positive, particularly the medical care that I provided. As such, please participate in the survey; anything less than excellent does not meet my expectations or intentions. [facility] and [facility] participate in nationally recognized quality of care measures. If your blood pressure is greater than 120/80, as reported below, we urge that you seek medical care to address the potential of high blood pressure, commonly known as hypertension. Hypertension can be hereditary or can be caused by certain medical conditions, pain, stress, or \"white coat syndrome. \"       Please make an appointment with your health care provider(s) for follow up of your Emergency Department visit. VITALS:   Patient Vitals for the past 8 hrs:   Temp Pulse Resp BP SpO2   10/12/17 0015 99.1 °F (37.3 °C) 99 18 (!) 141/91 96 %          Thank you for allowing us to provide you with medical care today. We realize that you have many choices for your emergency care needs. Please choose us in the future for any continued health care needs. Elisabet Carroll, [address] [place] [address].   Office: [phone]

## 2017-10-12 NOTE — ED TRIAGE NOTES
Patient arriving c/o chronic back pain; report he was supposed to receive an epidural block but it wasn't covered by his insurance. Reports he just wants something for his pain. Pain in his lower back radiating down his LEFT leg. No meds PTA.

## 2017-10-12 NOTE — ED PROVIDER NOTES
HPI Comments: 40 yo male with hx of back pain here for evaluation of back pain. States he has been dx with sciatica and is being followed by Ortho Dr Magda Sanchez. Had MRI per pt and is awaiting surgery; has epidural injection scheduled. States he continues with pain to left buttocks that travels down left leg. States his dog got away and he twisted injuring back. Linda loss of bowel or bladder; no weakness. Denies new injury since last imaging. Linda fever, chills, CP, SOB, abd pain, flank pain, urinary symptoms. +Smoker.         Patient is a 41 y.o. male presenting with back pain. The history is provided by the patient. Back Pain    This is a chronic problem. The current episode started more than 2 days ago. The problem has been gradually worsening. The pain is associated with no known injury. The pain is present in the lumbar spine. The quality of the pain is described as shooting and aching. The pain is at a severity of 9/10. The pain is moderate. The symptoms are aggravated by certain positions. Pertinent negatives include no chest pain, no fever, no bowel incontinence, no perianal numbness, no leg pain, no paresthesias, no paresis, no tingling and no weakness. Past Medical History:   Diagnosis Date    Back pain        Past Surgical History:   Procedure Laterality Date    HX OTHER SURGICAL      Nasal bridge fracture         Family History:   Problem Relation Age of Onset    Diabetes Mother        Social History     Social History    Marital status: SINGLE     Spouse name: N/A    Number of children: N/A    Years of education: N/A     Occupational History    Not on file.      Social History Main Topics    Smoking status: Current Every Day Smoker     Packs/day: 0.50    Smokeless tobacco: Never Used    Alcohol use 0.0 oz/week     0 Standard drinks or equivalent per week      Comment: Rarely    Drug use: No    Sexual activity: No     Other Topics Concern    Not on file     Social History Narrative         ALLERGIES: Tramadol and Tylenol [acetaminophen]    Review of Systems   Constitutional: Negative for activity change and fever. HENT: Negative for facial swelling. Eyes: Negative for discharge. Respiratory: Negative for cough. Cardiovascular: Negative for chest pain and leg swelling. Gastrointestinal: Negative for abdominal distention and bowel incontinence. Musculoskeletal: Positive for back pain and myalgias. Skin: Negative for color change. Neurological: Negative for tingling, seizures, syncope, weakness and paresthesias. Psychiatric/Behavioral: Negative for behavioral problems. Vitals:    10/12/17 0015   BP: (!) 141/91   Pulse: 99   Resp: 18   Temp: 99.1 °F (37.3 °C)   SpO2: 96%   Weight: 62.5 kg (137 lb 12.8 oz)   Height: 5' 9\" (1.753 m)            Physical Exam   Constitutional: He is oriented to person, place, and time. He appears well-developed and well-nourished. He appears distressed (moderate). HENT:   Head: Normocephalic and atraumatic. Right Ear: External ear normal.   Left Ear: External ear normal.   Nose: Nose normal.   Mouth/Throat: Oropharynx is clear and moist.   Eyes: Conjunctivae and EOM are normal. Pupils are equal, round, and reactive to light. Right eye exhibits no discharge. Left eye exhibits no discharge. Neck: Normal range of motion. Neck supple. Cardiovascular: Normal rate, regular rhythm, normal heart sounds and intact distal pulses. Pulmonary/Chest: Effort normal and breath sounds normal.   Abdominal: Soft. Bowel sounds are normal. He exhibits no distension. There is no tenderness. There is no rebound and no guarding. Musculoskeletal: Normal range of motion. He exhibits tenderness. He exhibits no edema. Lumbar back: He exhibits tenderness. He exhibits normal range of motion, no bony tenderness, no swelling and no edema. Back:    Neurological: He is alert and oriented to person, place, and time.  He has normal strength. He is not disoriented. No cranial nerve deficit or sensory deficit. Coordination and gait normal. GCS eye subscore is 4. GCS verbal subscore is 5. GCS motor subscore is 6. Skin: Skin is warm and dry. No rash noted. Psychiatric: He has a normal mood and affect. His behavior is normal. Judgment and thought content normal.   Nursing note and vitals reviewed. MDM  Number of Diagnoses or Management Options  Chronic low back pain with left-sided sciatica, unspecified back pain laterality:     ED Course       Procedures    Patient has been reassessed. Aware he MUST follow up with Ortho. Ready to discharge home. Patient's results have been reviewed with them. Patient and/or family have verbally conveyed their understanding and agreement of the patient's signs, symptoms, diagnosis, treatment and prognosis and additionally agree to follow up as recommended or return to the Emergency Room should their condition change prior to follow-up. Discharge instructions have also been provided to the patient with some educational information regarding their diagnosis as well a list of reasons why they would want to return to the ER prior to their follow-up appointment should their condition change.   ALEAH Ramos

## 2018-01-28 ENCOUNTER — APPOINTMENT (OUTPATIENT)
Dept: GENERAL RADIOLOGY | Age: 48
End: 2018-01-28
Attending: EMERGENCY MEDICINE
Payer: MEDICAID

## 2018-01-28 ENCOUNTER — HOSPITAL ENCOUNTER (EMERGENCY)
Age: 48
Discharge: HOME | End: 2018-01-28
Attending: EMERGENCY MEDICINE
Payer: MEDICAID

## 2018-01-28 VITALS
OXYGEN SATURATION: 100 % | HEART RATE: 87 BPM | TEMPERATURE: 97.7 F | WEIGHT: 170 LBS | RESPIRATION RATE: 16 BRPM | BODY MASS INDEX: 25.18 KG/M2 | SYSTOLIC BLOOD PRESSURE: 130 MMHG | HEIGHT: 69 IN | DIASTOLIC BLOOD PRESSURE: 94 MMHG

## 2018-01-28 DIAGNOSIS — S61.411A LACERATION OF RIGHT HAND WITHOUT FOREIGN BODY, INITIAL ENCOUNTER: Primary | ICD-10-CM

## 2018-01-28 PROCEDURE — 77030031132 HC SUT NYL COVD -A

## 2018-01-28 PROCEDURE — 77030018836 HC SOL IRR NACL ICUM -A

## 2018-01-28 PROCEDURE — 74011250636 HC RX REV CODE- 250/636: Performed by: NURSE PRACTITIONER

## 2018-01-28 PROCEDURE — 74011250637 HC RX REV CODE- 250/637: Performed by: NURSE PRACTITIONER

## 2018-01-28 PROCEDURE — 90471 IMMUNIZATION ADMIN: CPT

## 2018-01-28 PROCEDURE — 74011000250 HC RX REV CODE- 250: Performed by: NURSE PRACTITIONER

## 2018-01-28 PROCEDURE — 73130 X-RAY EXAM OF HAND: CPT

## 2018-01-28 PROCEDURE — 75810000293 HC SIMP/SUPERF WND  RPR

## 2018-01-28 PROCEDURE — 99283 EMERGENCY DEPT VISIT LOW MDM: CPT

## 2018-01-28 PROCEDURE — 90715 TDAP VACCINE 7 YRS/> IM: CPT | Performed by: NURSE PRACTITIONER

## 2018-01-28 RX ORDER — AMOXICILLIN AND CLAVULANATE POTASSIUM 875; 125 MG/1; MG/1
1 TABLET, FILM COATED ORAL 2 TIMES DAILY
Qty: 14 TAB | Refills: 0 | Status: SHIPPED | OUTPATIENT
Start: 2018-01-28 | End: 2018-02-19

## 2018-01-28 RX ORDER — OXYCODONE AND ACETAMINOPHEN 5; 325 MG/1; MG/1
1 TABLET ORAL
Qty: 10 TAB | Refills: 0 | Status: SHIPPED | OUTPATIENT
Start: 2018-01-28 | End: 2018-02-19

## 2018-01-28 RX ORDER — OXYCODONE HYDROCHLORIDE 5 MG/1
10 TABLET ORAL
Status: COMPLETED | OUTPATIENT
Start: 2018-01-28 | End: 2018-01-28

## 2018-01-28 RX ORDER — LIDOCAINE HYDROCHLORIDE 10 MG/ML
5 INJECTION INFILTRATION; PERINEURAL ONCE
Status: COMPLETED | OUTPATIENT
Start: 2018-01-28 | End: 2018-01-28

## 2018-01-28 RX ORDER — BACITRACIN 500 UNIT/G
1 PACKET (EA) TOPICAL
Status: COMPLETED | OUTPATIENT
Start: 2018-01-28 | End: 2018-01-28

## 2018-01-28 RX ADMIN — Medication 2 ML: at 13:38

## 2018-01-28 RX ADMIN — BACITRACIN 1 PACKET: 500 OINTMENT TOPICAL at 15:05

## 2018-01-28 RX ADMIN — OXYCODONE HYDROCHLORIDE 10 MG: 5 TABLET ORAL at 14:12

## 2018-01-28 RX ADMIN — TETANUS TOXOID, REDUCED DIPHTHERIA TOXOID AND ACELLULAR PERTUSSIS VACCINE, ADSORBED 0.5 ML: 5; 2.5; 8; 8; 2.5 SUSPENSION INTRAMUSCULAR at 14:12

## 2018-01-28 RX ADMIN — LIDOCAINE HYDROCHLORIDE 5 ML: 10 INJECTION, SOLUTION INFILTRATION; PERINEURAL at 13:38

## 2018-01-28 NOTE — ED TRIAGE NOTES
Pt arrives from home c/o laceration that occurred last night. Pt reports he was cutting a chicken when he cut his RIGHT hand. Pt arrives with laceration to the RIGHT hand between the thumb and and 2nd and 3rd finger. Pt unsure of last tetanus.

## 2018-01-28 NOTE — DISCHARGE INSTRUCTIONS
Cuts on the Hand Closed With Stitches: Care Instructions  Your Care Instructions    A cut on your hand can be on your fingers, your thumb, or the front or back of your hand. Sometimes a cut can injure the tendons, blood vessels, or nerves of your hand. The doctor used stitches to close the cut. Using stitches also helps the cut heal and reduces scarring. The doctor may have given you a splint to help prevent you from moving your hand, fingers, or thumb. If the cut went deep and through the skin, the doctor put in two layers of stitches. The deeper layer brings the deep part of the cut together. These stitches will dissolve and don't need to be removed. The stitches in the upper layer are the ones you see on the cut. You will probably have a bandage. You will need to have the stitches removed, usually in 7 to 14 days. The doctor may suggest that you see a hand specialist if the cut is very deep or if you have trouble moving your fingers or have less feeling in your hand. The doctor has checked you carefully, but problems can develop later. If you notice any problems or new symptoms, get medical treatment right away. Follow-up care is a key part of your treatment and safety. Be sure to make and go to all appointments, and call your doctor if you are having problems. It's also a good idea to know your test results and keep a list of the medicines you take. How can you care for yourself at home? · Keep the cut dry for the first 24 to 48 hours. After this, you can shower if your doctor okays it. Pat the cut dry. · Don't soak the cut, such as in a bathtub. Your doctor will tell you when it's safe to get the cut wet. · If your doctor told you how to care for your cut, follow your doctor's instructions. If you did not get instructions, follow this general advice:  ¨ After the first 24 to 48 hours, wash around the cut with clean water 2 times a day.  Don't use hydrogen peroxide or alcohol, which can slow healing. ¨ You may cover the cut with a thin layer of petroleum jelly, such as Vaseline, and a nonstick bandage. ¨ Apply more petroleum jelly and replace the bandage as needed. · Prop up the sore hand on a pillow anytime you sit or lie down during the next 3 days. Try to keep it above the level of your heart. This will help reduce swelling. · Avoid any activity that could cause your cut to reopen. · Do not remove the stitches on your own. Your doctor will tell you when to come back to have the stitches removed. · Be safe with medicines. Take pain medicines exactly as directed. ¨ If the doctor gave you a prescription medicine for pain, take it as prescribed. ¨ If you are not taking a prescription pain medicine, ask your doctor if you can take an over-the-counter medicine. When should you call for help? Call your doctor now or seek immediate medical care if:  ? · You have new pain, or your pain gets worse. ? · The skin near the cut is cold or pale or changes color. ? · You have tingling, weakness, or numbness near the cut.   ? · The cut starts to bleed, and blood soaks through the bandage. Oozing small amounts of blood is normal.   ? · You have trouble moving the area of the hand near the cut.   ? · You have symptoms of infection, such as:  ¨ Increased pain, swelling, warmth, or redness around the cut. ¨ Red streaks leading from the cut. ¨ Pus draining from the cut. ¨ A fever. ? Watch closely for changes in your health, and be sure to contact your doctor if:  ? · You do not get better as expected. Where can you learn more? Go to http://erum-perlita.info/. Enter T250 in the search box to learn more about \"Cuts on the Hand Closed With Stitches: Care Instructions. \"  Current as of: March 20, 2017  Content Version: 11.4  © 6184-0013 [employer].  Care instructions adapted under license by [employer] (which disclaims liability or warranty for this information). If you have questions about a medical condition or this instruction, always ask your healthcare professional. [address] any warranty or liability for your use of this information. We hope that we have addressed all of your medical concerns. The examination and treatment you received in the Emergency Department were for an emergent problem and were not intended as complete care. It is important that you follow up with your healthcare provider(s) for ongoing care. If your symptoms worsen or do not improve as expected, and you are unable to reach your usual health care provider(s), you should return to the Emergency Department. Today's healthcare is undergoing tremendous change, and patient satisfaction surveys are one of the many tools to assess the quality of medical care. You may receive a survey from the [employer] organization regarding your experience in the Emergency Department. I hope that your experience has been completely positive, particularly the medical care that I provided. As such, please participate in the survey; anything less than excellent does not meet my expectations or intentions. [facility] and [facility] participate in nationally recognized quality of care measures. If your blood pressure is greater than 120/80, as reported below, we urge that you seek medical care to address the potential of high blood pressure, commonly known as hypertension. Hypertension can be hereditary or can be caused by certain medical conditions, pain, stress, or \"white coat syndrome. \"       Please make an appointment with your health care provider(s) for follow up of your Emergency Department visit. VITALS:   Patient Vitals for the past 8 hrs:   Temp Pulse Resp BP SpO2   01/28/18 1256 98.2 °F (36.8 °C) 96 16 127/89 97 %          Thank you for allowing us to provide you with medical care today.   We realize that you have many choices for your emergency care needs. Please choose us in the future for any continued health care needs. [facility], [address]: [phone]            No results found for this or any previous visit (from the past 24 hour(s)). Xr Hand Rt Min 3 V    Result Date: 1/28/2018  EXAM:  XR HAND RT MIN 3 V INDICATION: Right hand pain after laceration last night. COMPARISON: None. FINDINGS: Three views of the right hand demonstrate no fracture or other acute osseous or articular abnormality. There is a radiopaque 2 mm structure in the soft tissue lateral to the second proximal phalanx. There is also a 1 x 0.3 mm radiopaque structure lateral to the right second PIP joint. IMPRESSION: No open fracture.  2 retained radiopaque objects in the soft tissues of the second digit

## 2018-01-28 NOTE — ED NOTES
Bacitracin and dry nonadherent dressing placed to R hand. Discharge instructions given to patient by MD and nurse. Pt has been given counseling on medication use and verbalizes understanding. Pt ambulated off of unit in no signs of distress.

## 2018-01-28 NOTE — ED PROVIDER NOTES
HPI Comments: The pt is a 41year old male who presents with complaints of right hand laceration that occurred around 7415 this am while slicing chicken. Unknown last tetanus. There are no other acute healthcare concerns at this time. Pt denies fevers, chills, night sweats, chest pain, pressure, SOB, ACE, PND, orthopnea, abdominal pain, n/v/d, melena, hematuria, dysuria, constipation, HA, dizziness, and syncope      Past Medical History:  No date: Back pain    Past Surgical History:  No date: HX OTHER SURGICAL      Comment: Nasal bridge fracture    PCP:  Marta Swain MD      The history is provided by the patient. Past Medical History:   Diagnosis Date    Back pain        Past Surgical History:   Procedure Laterality Date    HX OTHER SURGICAL      Nasal bridge fracture         Family History:   Problem Relation Age of Onset    Diabetes Mother        Social History     Social History    Marital status: SINGLE     Spouse name: N/A    Number of children: N/A    Years of education: N/A     Occupational History    Not on file. Social History Main Topics    Smoking status: Current Every Day Smoker     Packs/day: 0.50    Smokeless tobacco: Never Used    Alcohol use 0.0 oz/week     0 Standard drinks or equivalent per week      Comment: Rarely    Drug use: No    Sexual activity: No     Other Topics Concern    Not on file     Social History Narrative         ALLERGIES: Tramadol and Tylenol [acetaminophen]    Review of Systems   Constitutional: Negative for activity change, appetite change, chills, diaphoresis, fatigue, fever and unexpected weight change. HENT: Negative for congestion, ear pain, rhinorrhea, sinus pressure, sore throat, tinnitus, trouble swallowing and voice change. Eyes: Negative for pain, discharge, redness and visual disturbance. Respiratory: Negative for apnea, cough, choking, chest tightness, shortness of breath, wheezing and stridor.     Cardiovascular: Negative for chest pain, palpitations and leg swelling. Gastrointestinal: Negative for abdominal pain, constipation, nausea and vomiting. Endocrine: Negative for cold intolerance and heat intolerance. Genitourinary: Negative for difficulty urinating, dysuria, flank pain, hematuria, testicular pain and urgency. Musculoskeletal: Negative for arthralgias, back pain, gait problem, joint swelling, myalgias, neck pain and neck stiffness. Skin: Positive for wound. Negative for color change, pallor and rash. Allergic/Immunologic: Negative for immunocompromised state. Neurological: Negative for dizziness, tremors, syncope, weakness, light-headedness, numbness and headaches. Hematological: Does not bruise/bleed easily. Psychiatric/Behavioral: Negative for agitation, confusion and suicidal ideas. Vitals:    01/28/18 1256   BP: 127/89   Pulse: 96   Resp: 16   Temp: 98.2 °F (36.8 °C)   SpO2: 97%   Weight: 77.1 kg (170 lb)   Height: 5' 9\" (1.753 m)            Physical Exam   Constitutional: He is oriented to person, place, and time. He appears well-developed and well-nourished. No distress. HENT:   Head: Atraumatic. Nose: Nose normal.   Mouth/Throat: No oropharyngeal exudate. Eyes: Conjunctivae and EOM are normal. Right eye exhibits no discharge. Left eye exhibits no discharge. No scleral icterus. Neck: Normal range of motion. Neck supple. No JVD present. No tracheal deviation present. No thyromegaly present. Cardiovascular: Normal rate and regular rhythm. Exam reveals no gallop and no friction rub. No murmur heard. Pulmonary/Chest: Breath sounds normal. No stridor. No respiratory distress. He has no wheezes. He has no rales. He exhibits no tenderness. Abdominal: Soft. Bowel sounds are normal. He exhibits no distension and no mass. There is no tenderness. There is no rebound and no guarding. Musculoskeletal: Normal range of motion. He exhibits no edema or tenderness.         Right hand: He exhibits laceration. He exhibits normal range of motion, no tenderness, no bony tenderness, normal two-point discrimination, normal capillary refill, no deformity and no swelling. Normal sensation noted. Decreased sensation is not present in the ulnar distribution, is not present in the medial distribution and is not present in the radial distribution. Normal strength noted. He exhibits no finger abduction, no thumb/finger opposition and no wrist extension trouble. Hands:  Lymphadenopathy:     He has no cervical adenopathy. Neurological: He is alert and oriented to person, place, and time. Coordination normal.   Skin: Skin is warm and dry. He is not diaphoretic. Psychiatric: He has a normal mood and affect. His behavior is normal.   Nursing note and vitals reviewed. MDM  Number of Diagnoses or Management Options  Laceration of right hand without foreign body, initial encounter:   Diagnosis management comments:    * XR   * tetanus   * wound repair          Amount and/or Complexity of Data Reviewed  Tests in the radiology section of CPT®: ordered and reviewed  Discussion of test results with the performing providers: yes  Discuss the patient with other providers: yes    Risk of Complications, Morbidity, and/or Mortality  General comments:    - stable, ambulatory pt in NAD    Patient Progress  Patient progress: stable    ED Course       WOUND REPAIR  Date/Time: 1/28/2018 3:40 PM  Performed by: NPPreparation: skin prepped with ChloraPrep, skin prepped with Shur-Clens and sterile field established  Pre-procedure re-eval: Immediately prior to the procedure, the patient was reevaluated and found suitable for the planned procedure and any planned medications. Time out: Immediately prior to the procedure a time out was called to verify the correct patient, procedure, equipment, staff and marking as appropriate. .  Location details: right hand  Wound length:2.5 cm or less  Anesthesia: local infiltration and see MAR for details    Anesthesia:  Local Anesthetic: lidocaine 1% without epinephrine  Foreign bodies: no foreign bodies  Irrigation solution: saline  Irrigation method: jet lavage  Debridement: none  Skin closure: 4-0 nylon  Number of sutures: 6  Approximation: close  Patient tolerance: Patient tolerated the procedure well with no immediate complications  My total time at bedside, performing this procedure was 1-15 minutes. Comments: Wound explored  Further investigation by pt history reveals that pt is a  and has recently being using a metallic base paint which was scrubbed off of the wound itself                                    3:42 PM  Pt has been reevaluated. There are no new complaints, changes, or physical findings at this time. Medications have been reviewed w/ pt and/or family. Pt and/or family's questions have been answered. Pt and/or family expressed good understanding of the dx/tx/rx and is in agreement with plan of care. Pt instructed and agreed to f/u w/ PCP and to return to ED upon further deterioration. Pt is ready for discharge. LABORATORY TESTS:  No results found for this or any previous visit (from the past 12 hour(s)). IMAGING RESULTS:  XR HAND RT MIN 3 V   Final Result        Xr Hand Rt Min 3 V    Result Date: 1/28/2018  EXAM:  XR HAND RT MIN 3 V INDICATION: Right hand pain after laceration last night. COMPARISON: None. FINDINGS: Three views of the right hand demonstrate no fracture or other acute osseous or articular abnormality. There is a radiopaque 2 mm structure in the soft tissue lateral to the second proximal phalanx. There is also a 1 x 0.3 mm radiopaque structure lateral to the right second PIP joint. IMPRESSION: No open fracture.  2 retained radiopaque objects in the soft tissues of the second digit         MEDICATIONS GIVEN:  Medications   lidocaine/EPINEPHrine/tetracaine/methylcellulose (LET) topical gel gel 2 mL (2 mL Topical Given 1/28/18 7187)   lidocaine (XYLOCAINE) 10 mg/mL (1 %) injection 5 mL (5 mL SubCUTAneous Given by Provider 18 4190)   diph,Pertuss(AC),Tet Vac-PF (BOOSTRIX) suspension 0.5 mL (0.5 mL IntraMUSCular Given 18 1412)   oxyCODONE IR (ROXICODONE) tablet 10 mg (10 mg Oral Given 18 1412)   bacitracin 500 unit/gram packet 1 Packet (1 Packet Topical Given 18 0415)       IMPRESSION:  1. Laceration of right hand without foreign body, initial encounter        PLAN:  1. Discharge Medication List as of 2018  2:49 PM      START taking these medications    Details   amoxicillin-clavulanate (AUGMENTIN) 875-125 mg per tablet Take 1 Tab by mouth two (2) times a day., Print, Disp-14 Tab, R-0      oxyCODONE-acetaminophen (PERCOCET) 5-325 mg per tablet Take 1 Tab by mouth every four (4) hours as needed for Pain. Max Daily Amount: 6 Tabs., Print, Disp-10 Tab, R-0         STOP taking these medications       naproxen (NAPROSYN) 500 mg tablet Comments:   Reason for Stopping:         diazePAM (VALIUM) 5 mg tablet Comments:   Reason for Stopping:         methylPREDNISolone (MEDROL, CARIE,) 4 mg tablet Comments:   Reason for Stopping:         HYDROcodone-acetaminophen (NORCO) 5-325 mg per tablet Comments:   Reason for Stopping:         cyclobenzaprine (FLEXERIL) 10 mg tablet Comments:   Reason for Stoppin.   Follow-up Information     Follow up With Details Comments Contact Info    Will Fierro MD In 14 days For suture removal, For wound re-check [address]   [address] [facility]  [address] [phone]      [facility] EMERGENCY DEP  As needed, If symptoms worsen [address]  [phone]        3.  Supportive care     Return to ED if worse       Ezequiel Gomez NP  3:42 PM

## 2018-02-19 ENCOUNTER — HOSPITAL ENCOUNTER (EMERGENCY)
Age: 48
Discharge: HOME | End: 2018-02-19
Attending: EMERGENCY MEDICINE
Payer: MEDICAID

## 2018-02-19 VITALS
SYSTOLIC BLOOD PRESSURE: 128 MMHG | OXYGEN SATURATION: 95 % | TEMPERATURE: 98 F | RESPIRATION RATE: 14 BRPM | BODY MASS INDEX: 23.27 KG/M2 | DIASTOLIC BLOOD PRESSURE: 92 MMHG | WEIGHT: 157.13 LBS | HEIGHT: 69 IN | HEART RATE: 71 BPM

## 2018-02-19 DIAGNOSIS — Z48.02 VISIT FOR SUTURE REMOVAL: Primary | ICD-10-CM

## 2018-02-19 PROCEDURE — 99283 EMERGENCY DEPT VISIT LOW MDM: CPT

## 2018-02-19 PROCEDURE — 74011000250 HC RX REV CODE- 250: Performed by: PHYSICIAN ASSISTANT

## 2018-02-19 PROCEDURE — 74011250637 HC RX REV CODE- 250/637: Performed by: PHYSICIAN ASSISTANT

## 2018-02-19 RX ORDER — HYDROCODONE BITARTRATE AND ACETAMINOPHEN 5; 325 MG/1; MG/1
1 TABLET ORAL
Status: COMPLETED | OUTPATIENT
Start: 2018-02-19 | End: 2018-02-19

## 2018-02-19 RX ORDER — BACITRACIN 500 UNIT/G
1 PACKET (EA) TOPICAL
Status: COMPLETED | OUTPATIENT
Start: 2018-02-19 | End: 2018-02-19

## 2018-02-19 RX ORDER — CEPHALEXIN 500 MG/1
500 CAPSULE ORAL 4 TIMES DAILY
Qty: 28 CAP | Refills: 0 | Status: SHIPPED | OUTPATIENT
Start: 2018-02-19 | End: 2018-02-26

## 2018-02-19 RX ORDER — BACITRACIN 500 UNIT/G
PACKET (EA) TOPICAL
Status: DISCONTINUED
Start: 2018-02-19 | End: 2018-02-19 | Stop reason: HOSPADM

## 2018-02-19 RX ADMIN — HYDROCODONE BITARTRATE AND ACETAMINOPHEN 1 TABLET: 5; 325 TABLET ORAL at 17:32

## 2018-02-19 RX ADMIN — BACITRACIN 1 PACKET: 500 OINTMENT TOPICAL at 18:56

## 2018-02-19 NOTE — ED PROVIDER NOTES
HPI Comments: 57 y.o. male with past medical history significant for back pain who presents ambulatory from home with chief complaint of wound check. Pt reports to the ED today for suture removal from right palm. Pt c/o persistent moderate sore pain and swelling over suture site with \"a little bit of yellow stuff draining\". Pt denies medication allergies. Pt specifically denies fevers. There are no other acute medical concerns at this time. Old Chart Review:  Pt evaluated in the ED on 1/28/18. Pt given 6 4-0 nylon sutures in right palm. Pt discharged home with Augmentin and Percocet. Social hx: current every day tobacco smoker; rare EtOH use; denies illicit drug use  PCP: Cristopher Mishra MD    Note written by Mak Ayala, as dictated by Peace Redman PA-C 5:30 PM          The history is provided by the patient and medical records. No  was used. Past Medical History:   Diagnosis Date    Back pain        Past Surgical History:   Procedure Laterality Date    HX OTHER SURGICAL      Nasal bridge fracture         Family History:   Problem Relation Age of Onset    Diabetes Mother        Social History     Social History    Marital status: SINGLE     Spouse name: N/A    Number of children: N/A    Years of education: N/A     Occupational History    Not on file. Social History Main Topics    Smoking status: Current Every Day Smoker     Packs/day: 0.50    Smokeless tobacco: Never Used    Alcohol use 0.0 oz/week     0 Standard drinks or equivalent per week      Comment: Rarely    Drug use: No    Sexual activity: No     Other Topics Concern    Not on file     Social History Narrative         ALLERGIES: Tramadol and Tylenol [acetaminophen]    Review of Systems   Constitutional: Negative for fever. HENT: Negative for congestion. Eyes: Negative for discharge and redness. Respiratory: Negative for cough and shortness of breath.     Cardiovascular: Negative for chest pain. Gastrointestinal: Negative for diarrhea and vomiting. Genitourinary: Negative for difficulty urinating. Musculoskeletal: Negative for joint swelling. Skin: Positive for wound (right palm). Neurological: Negative for syncope and headaches. Psychiatric/Behavioral: Negative for behavioral problems. All other systems reviewed and are negative. Vitals:    02/19/18 1642 02/19/18 1905   BP: 115/83 (!) 128/92   Pulse: 87 71   Resp: 16 14   Temp: 98.3 °F (36.8 °C) 98 °F (36.7 °C)   SpO2: 96% 95%   Weight: 71.3 kg (157 lb 2 oz)    Height: 5' 9\" (1.753 m)             Physical Exam   Constitutional: He is oriented to person, place, and time. He appears well-developed and well-nourished. HENT:   Head: Normocephalic. Eyes: Conjunctivae are normal.   Neck: Neck supple. Cardiovascular: Normal rate. Pulmonary/Chest: Effort normal. No respiratory distress. Abdominal: He exhibits no distension. Musculoskeletal: Normal range of motion. Healing laceration to the right hand over the anterior palm, proximal to the thumb. No erythema. + slight swelling and TTP. No drainage or fluctuance. No dehiscence. Neurological: He is alert and oriented to person, place, and time. Skin: Skin is warm and dry. Psychiatric: He has a normal mood and affect. MDM  Number of Diagnoses or Management Options  Visit for suture removal:   Diagnosis management comments: 57year old male presenting for suture removal.  Still with some pain and swelling. Pt noted drainage but area does not look acutely infected, however given hx drainage and continued pain will write course of keflex. Hand f/u given for persistent symptoms. Sutures removed and care instructions given. Amount and/or Complexity of Data Reviewed  Review and summarize past medical records: yes  Discuss the patient with other providers: yes (Dr. Magui Burrows, ED attending)          ED Course       Procedures        6 sutures removed. Top most layer of skin somewhat open but no gaping with palpation of wound.   ALEAH Montanez  6:48 PM

## 2018-02-19 NOTE — DISCHARGE INSTRUCTIONS
Learning About Stitches and Staples Removal  When are stitches and staples removed? Your doctor will tell you when to have your stitches or staples removed, usually in 7 to 14 days. How long you'll be told to wait will depend on things like where the wound is located, how big and how deep the wound is, and what your general health is like. Do not remove the stitches on your own. Stitches on the face are usually removed within a week. But stitches and staples on other areas of the body, such as on the back or belly or over a joint, may need to stay in place longer, often a week or two. Be sure to follow your doctor's instructions. How are stitches and staples removed? It usually doesn't hurt when the doctor removes the stitches or staples. You may feel a tug as each stitch or staple is removed. · You will either be seated or lying down. · To remove stitches, the doctor will use scissors to cut each of the knots and then pull the threads out. · To remove staples, the doctor will use a tool to take out the staples one at a time. · The area may still feel tender after the stitches or staples are gone. But it should feel better within a few minutes or up to a few hours. What can you expect after stitches and staples are removed? Depending on the type and location of the cut, you will have a scar. Scars usually fade over time. Keep the area clean, but you won't need a bandage. When should you call for help? Call your doctor now or seek immediate medical care if :  · You have new pain, or your pain gets worse. · You have trouble moving the area near the scar. · You have symptoms of infection, such as:  ¨ Increased pain, swelling, warmth, or redness around the scar. ¨ Red streaks leading from the scar. ¨ Pus draining from the scar. ¨ A fever. Watch closely for changes in your health, and be sure to contact your doctor if:  · The scar opens. · You do not get better as expected.   Follow-up care is a key part of your treatment and safety. Be sure to make and go to all appointments, and call your doctor if you do not get better as expected. It's also a good idea to keep a list of the medicines you take. Where can you learn more? Go to http://erum-perlita.info/. Enter Z014 in the search box to learn more about \"Learning About Stitches and Staples Removal.\"  Current as of: March 20, 2017  Content Version: 11.4  © 2575-4976 [employer]. Care instructions adapted under license by [employer] (which disclaims liability or warranty for this information). If you have questions about a medical condition or this instruction, always ask your healthcare professional. [address] any warranty or liability for your use of this information.

## 2018-02-19 NOTE — ED TRIAGE NOTES
Pt had sutures placed below his right index finger about 3 weeks ago and is her to have it checked for infection and to have the sutures removed today.

## 2018-04-02 ENCOUNTER — HOSPITAL ENCOUNTER (EMERGENCY)
Age: 48
Discharge: HOME | End: 2018-04-02
Attending: STUDENT IN AN ORGANIZED HEALTH CARE EDUCATION/TRAINING PROGRAM
Payer: MEDICAID

## 2018-04-02 VITALS
RESPIRATION RATE: 18 BRPM | WEIGHT: 152 LBS | HEIGHT: 69 IN | OXYGEN SATURATION: 97 % | DIASTOLIC BLOOD PRESSURE: 90 MMHG | BODY MASS INDEX: 22.51 KG/M2 | TEMPERATURE: 98.1 F | HEART RATE: 99 BPM | SYSTOLIC BLOOD PRESSURE: 145 MMHG

## 2018-04-02 DIAGNOSIS — M54.50 ACUTE BILATERAL LOW BACK PAIN WITHOUT SCIATICA: ICD-10-CM

## 2018-04-02 DIAGNOSIS — J01.01 ACUTE RECURRENT MAXILLARY SINUSITIS: Primary | ICD-10-CM

## 2018-04-02 PROCEDURE — 99282 EMERGENCY DEPT VISIT SF MDM: CPT

## 2018-04-02 RX ORDER — AZITHROMYCIN 250 MG/1
TABLET, FILM COATED ORAL
Qty: 6 TAB | Refills: 0 | Status: SHIPPED | OUTPATIENT
Start: 2018-04-02 | End: 2018-04-07

## 2018-04-02 RX ORDER — PREDNISONE 50 MG/1
50 TABLET ORAL DAILY
Qty: 5 TAB | Refills: 0 | Status: SHIPPED | OUTPATIENT
Start: 2018-04-02 | End: 2018-04-07

## 2018-04-02 RX ORDER — FLUTICASONE PROPIONATE 50 MCG
2 SPRAY, SUSPENSION (ML) NASAL DAILY
Qty: 1 BOTTLE | Refills: 0 | Status: SHIPPED | OUTPATIENT
Start: 2018-04-02

## 2018-04-02 RX ORDER — HYDROCODONE BITARTRATE AND ACETAMINOPHEN 7.5; 325 MG/1; MG/1
1 TABLET ORAL
Qty: 20 TAB | Refills: 0 | Status: SHIPPED | OUTPATIENT
Start: 2018-04-02 | End: 2018-06-11

## 2018-04-02 NOTE — ED NOTES

## 2018-04-02 NOTE — DISCHARGE INSTRUCTIONS
Sinusitis: Care Instructions  Your Care Instructions    Sinusitis is an infection of the lining of the sinus cavities in your head. Sinusitis often follows a cold. It causes pain and pressure in your head and face. In most cases, sinusitis gets better on its own in 1 to 2 weeks. But some mild symptoms may last for several weeks. Sometimes antibiotics are needed. Follow-up care is a key part of your treatment and safety. Be sure to make and go to all appointments, and call your doctor if you are having problems. It's also a good idea to know your test results and keep a list of the medicines you take. How can you care for yourself at home? · Take an over-the-counter pain medicine, such as acetaminophen (Tylenol), ibuprofen (Advil, Motrin), or naproxen (Aleve). Read and follow all instructions on the label. · If the doctor prescribed antibiotics, take them as directed. Do not stop taking them just because you feel better. You need to take the full course of antibiotics. · Be careful when taking over-the-counter cold or flu medicines and Tylenol at the same time. Many of these medicines have acetaminophen, which is Tylenol. Read the labels to make sure that you are not taking more than the recommended dose. Too much acetaminophen (Tylenol) can be harmful. · Breathe warm, moist air from a steamy shower, a hot bath, or a sink filled with hot water. Avoid cold, dry air. Using a humidifier in your home may help. Follow the directions for cleaning the machine. · Use saline (saltwater) nasal washes to help keep your nasal passages open and wash out mucus and bacteria. You can buy saline nose drops at a grocery store or drugstore. Or you can make your own at home by adding 1 teaspoon of salt and 1 teaspoon of baking soda to 2 cups of distilled water. If you make your own, fill a bulb syringe with the solution, insert the tip into your nostril, and squeeze gently. Mickiel Sickle your nose.   · Put a hot, wet towel or a warm gel pack on your face 3 or 4 times a day for 5 to 10 minutes each time. · Try a decongestant nasal spray like oxymetazoline (Afrin). Do not use it for more than 3 days in a row. Using it for more than 3 days can make your congestion worse. When should you call for help? Call your doctor now or seek immediate medical care if:  ? · You have new or worse swelling or redness in your face or around your eyes. ? · You have a new or higher fever. ? Watch closely for changes in your health, and be sure to contact your doctor if:  ? · You have new or worse facial pain. ? · The mucus from your nose becomes thicker (like pus) or has new blood in it. ? · You are not getting better as expected. Where can you learn more? Go to http://erum-perlita.info/. Enter A983 in the search box to learn more about \"Sinusitis: Care Instructions. \"  Current as of: May 12, 2017  Content Version: 11.4  © 8710-1475 [employer]. Care instructions adapted under license by [employer] (which disclaims liability or warranty for this information). If you have questions about a medical condition or this instruction, always ask your healthcare professional. [address] any warranty or liability for your use of this information. We hope that we have addressed all of your medical concerns. The examination and treatment you received in the Emergency Department were for an emergent problem and were not intended as complete care. It is important that you follow up with your healthcare provider(s) for ongoing care. If your symptoms worsen or do not improve as expected, and you are unable to reach your usual health care provider(s), you should return to the Emergency Department. Today's healthcare is undergoing tremendous change, and patient satisfaction surveys are one of the many tools to assess the quality of medical care.   You may receive a survey from the [facility] regarding your experience in the Emergency Department. I hope that your experience has been completely positive, particularly the medical care that I provided. As such, please participate in the survey; anything less than excellent does not meet my expectations or intentions. [facility] and [facility] participate in nationally recognized quality of care measures. If your blood pressure is greater than 120/80, as reported below, we urge that you seek medical care to address the potential of high blood pressure, commonly known as hypertension. Hypertension can be hereditary or can be caused by certain medical conditions, pain, stress, or \"white coat syndrome. \"       Please make an appointment with your health care provider(s) for follow up of your Emergency Department visit. VITALS:   Patient Vitals for the past 8 hrs:   Temp Pulse Resp BP SpO2   04/02/18 1328 98.1 °F (36.7 °C) 99 18 145/90 97 %          Thank you for allowing us to provide you with medical care today. We realize that you have many choices for your emergency care needs. Please choose us in the future for any continued health care needs. Dyllan Reynolds, [address] [facility] [address]: [phone]            No results found for this or any previous visit (from the past 24 hour(s)). No results found.

## 2018-04-16 NOTE — ED PROVIDER NOTES
HPI Comments: The pt is a 57year old male who presents with two unrelated complaints. First he is complaining of sinus pain and pressure. History of the same typically this time of the year. He reports thick mucus from bilateral nares with severe pressure. He has tried flonase and benadryl. Symptoms ongoing for the last 3 weeks. Second he hurt his back two days ago at work. He spent several hours painting overhead and has been in severe pain since. No fall, injury, or obvious trauma. No saddle anesthesia, distal numbness or tingling. No loss of bowel or bladder functions. Pt denies fevers, chills, night sweats, chest pain, pressure, SOB, ACE, PND, orthopnea, abdominal pain, n/v/d, melena, hematuria, dysuria, constipation, HA, dizziness, and syncope      Past Medical History:  No date: Back pain    Past Surgical History:  No date: HX OTHER SURGICAL      Comment: Nasal bridge fracture    PCP:  Romero Carias MD      Patient is a 57 y.o. male presenting with sinus pain and back pain. The history is provided by the patient. Sinus Pain    This is a recurrent problem. There has been no fever. The pain is at a severity of 8/10. The pain is moderate. Associated symptoms include congestion and sinus pressure. Pertinent negatives include no chills, no sweats, no ear pain, no hoarse voice, no sore throat, no swollen glands, no cough, no shortness of breath, no rhinorrhea, no neck pain, no neck pain, no headaches and no chest pain. He has tried nothing for the symptoms. The treatment provided no relief. Back Pain    Chronicity: acute on chronic  The current episode started more than 1 week ago. The problem has been gradually worsening. The problem occurs constantly. Patient reports not work related injury. The pain is associated with lifting and twisting. The pain is present in the lumbar spine. The quality of the pain is described as aching. The pain does not radiate. The pain is at a severity of 6/10.  The pain is moderate. The symptoms are aggravated by bending, twisting and certain positions. Stiffness is present in the morning. Pertinent negatives include no chest pain, no fever, no numbness, no weight loss, no headaches, no abdominal pain, no abdominal swelling, no bowel incontinence, no perianal numbness, no bladder incontinence, no dysuria, no pelvic pain, no leg pain, no paresthesias, no paresis, no tingling and no weakness. He has tried NSAIDs and analgesics for the symptoms. The treatment provided no relief. Past Medical History:   Diagnosis Date    Back pain        Past Surgical History:   Procedure Laterality Date    HX OTHER SURGICAL      Nasal bridge fracture         Family History:   Problem Relation Age of Onset    Diabetes Mother        Social History     Social History    Marital status: SINGLE     Spouse name: N/A    Number of children: N/A    Years of education: N/A     Occupational History    Not on file. Social History Main Topics    Smoking status: Current Every Day Smoker     Packs/day: 0.50    Smokeless tobacco: Never Used    Alcohol use 0.0 oz/week     0 Standard drinks or equivalent per week      Comment: Rarely    Drug use: No    Sexual activity: No     Other Topics Concern    Not on file     Social History Narrative         ALLERGIES: Tramadol and Tylenol [acetaminophen]    Review of Systems   Constitutional: Negative for activity change, appetite change, chills, diaphoresis, fatigue, fever, unexpected weight change and weight loss. HENT: Positive for congestion, sinus pain and sinus pressure. Negative for ear pain, hoarse voice, rhinorrhea, sore throat, tinnitus, trouble swallowing and voice change. Eyes: Negative for pain, discharge, redness and visual disturbance. Respiratory: Negative for apnea, cough, choking, chest tightness, shortness of breath, wheezing and stridor. Cardiovascular: Negative for chest pain, palpitations and leg swelling.    Gastrointestinal: Negative for abdominal pain, bowel incontinence, constipation, nausea and vomiting. Endocrine: Negative for cold intolerance and heat intolerance. Genitourinary: Negative for bladder incontinence, difficulty urinating, dysuria, flank pain, hematuria, pelvic pain, testicular pain and urgency. Musculoskeletal: Positive for back pain. Negative for arthralgias, gait problem, joint swelling, myalgias, neck pain and neck stiffness. Skin: Negative for color change, pallor, rash and wound. Allergic/Immunologic: Negative for immunocompromised state. Neurological: Negative for dizziness, tingling, tremors, syncope, weakness, light-headedness, numbness, headaches and paresthesias. Hematological: Does not bruise/bleed easily. Psychiatric/Behavioral: Negative for agitation, confusion and suicidal ideas. Vitals:    04/02/18 1328   BP: 145/90   Pulse: 99   Resp: 18   Temp: 98.1 °F (36.7 °C)   SpO2: 97%   Weight: 68.9 kg (152 lb)   Height: 5' 9\" (1.753 m)            Physical Exam   Constitutional: He is oriented to person, place, and time. He appears well-developed and well-nourished. No distress. HENT:   Head: Atraumatic. Nose: Mucosal edema and sinus tenderness present. No nose lacerations, septal deviation or nasal septal hematoma. No foreign bodies. Right sinus exhibits maxillary sinus tenderness and frontal sinus tenderness. Left sinus exhibits maxillary sinus tenderness and frontal sinus tenderness. Mouth/Throat: Uvula is midline and mucous membranes are normal. No oropharyngeal exudate, posterior oropharyngeal edema, posterior oropharyngeal erythema or tonsillar abscesses. Eyes: Conjunctivae and EOM are normal. Right eye exhibits no discharge. Left eye exhibits no discharge. No scleral icterus. Neck: Normal range of motion. Neck supple. No JVD present. No tracheal deviation present. No thyromegaly present. Cardiovascular: Normal rate and regular rhythm.   Exam reveals no gallop and no friction rub. No murmur heard. Pulmonary/Chest: Breath sounds normal. No stridor. No respiratory distress. He has no wheezes. He has no rales. He exhibits no tenderness. Abdominal: Soft. Bowel sounds are normal. He exhibits no distension and no mass. There is no tenderness. There is no rebound and no guarding. Musculoskeletal: Normal range of motion. He exhibits no edema. Cervical back: Normal.        Thoracic back: Normal.        Lumbar back: He exhibits tenderness and pain. He exhibits no bony tenderness. Back:    Lymphadenopathy:     He has no cervical adenopathy. Neurological: He is alert and oriented to person, place, and time. He has normal strength. No cranial nerve deficit or sensory deficit. Coordination normal. GCS eye subscore is 4. GCS verbal subscore is 5. GCS motor subscore is 6. Reflex Scores:       Patellar reflexes are 2+ on the right side and 2+ on the left side. Achilles reflexes are 2+ on the right side and 2+ on the left side. Skin: Skin is warm and dry. He is not diaphoretic. Psychiatric: He has a normal mood and affect. His behavior is normal.   Nursing note and vitals reviewed. MDM  Number of Diagnoses or Management Options  Acute bilateral low back pain without sciatica:   Acute recurrent maxillary sinusitis:   Risk of Complications, Morbidity, and/or Mortality  General comments:    - stable, ambulatory pt in NAD    Patient Progress  Patient progress: stable        ED Course       Procedures    1418  Pt has been reevaluated. There are no new complaints, changes, or physical findings at this time. Medications have been reviewed w/ pt and/or family. Pt and/or family's questions have been answered. Pt and/or family expressed good understanding of the dx/tx/rx and is in agreement with plan of care. Pt instructed and agreed to f/u w/ PCP and to return to ED upon further deterioration. Pt is ready for discharge.     LABORATORY TESTS:  No results found for this or any previous visit (from the past 12 hour(s)). IMAGING RESULTS:  No orders to display     No results found. MEDICATIONS GIVEN:  Medications - No data to display    IMPRESSION:  1. Acute recurrent maxillary sinusitis    2. Acute bilateral low back pain without sciatica        PLAN:  1. Discharge Medication List as of 4/2/2018  2:07 PM      START taking these medications    Details   azithromycin (ZITHROMAX Z-CARIE) 250 mg tablet Take 500 mg on day one and 250 on days two through five, Print, Disp-6 Tab, R-0      predniSONE (DELTASONE) 50 mg tablet Take 1 Tab by mouth daily for 5 days. , Print, Disp-5 Tab, R-0      fluticasone (FLONASE) 50 mcg/actuation nasal spray 2 Sprays by Both Nostrils route daily. , Print, Disp-1 Bottle, R-0      HYDROcodone-acetaminophen (NORCO) 7.5-325 mg per tablet Take 1 Tab by mouth every six (6) hours as needed for Pain. Max Daily Amount: 4 Tabs., Print, Disp-20 Tab, R-0           2. Follow-up Information     Follow up With Details Comments Contact Jimbo Tyler MD In 2 days  [address] [facility]  [facility] [id number]  [phone]      [facility] EMERGENCY DEP  As needed, If symptoms worsen [address] [facility]  [phone]        3.  Supportive care     Return to ED if worse         Abbi Tidwell NP  10:16 AM

## 2018-06-11 ENCOUNTER — APPOINTMENT (OUTPATIENT)
Dept: CT IMAGING | Age: 48
End: 2018-06-11
Attending: EMERGENCY MEDICINE
Payer: MEDICAID

## 2018-06-11 ENCOUNTER — HOSPITAL ENCOUNTER (EMERGENCY)
Age: 48
Discharge: HOME | End: 2018-06-11
Attending: EMERGENCY MEDICINE
Payer: MEDICAID

## 2018-06-11 VITALS
DIASTOLIC BLOOD PRESSURE: 73 MMHG | RESPIRATION RATE: 18 BRPM | BODY MASS INDEX: 24.44 KG/M2 | HEIGHT: 69 IN | TEMPERATURE: 98.1 F | WEIGHT: 165 LBS | SYSTOLIC BLOOD PRESSURE: 124 MMHG | HEART RATE: 70 BPM | OXYGEN SATURATION: 98 %

## 2018-06-11 DIAGNOSIS — S00.83XA CONTUSION OF FACE, INITIAL ENCOUNTER: ICD-10-CM

## 2018-06-11 DIAGNOSIS — S02.2XXA CLOSED FRACTURE OF NASAL BONE, INITIAL ENCOUNTER: Primary | ICD-10-CM

## 2018-06-11 PROCEDURE — 70486 CT MAXILLOFACIAL W/O DYE: CPT

## 2018-06-11 PROCEDURE — 99283 EMERGENCY DEPT VISIT LOW MDM: CPT

## 2018-06-11 RX ORDER — TETRACAINE HYDROCHLORIDE 5 MG/ML
1 SOLUTION OPHTHALMIC
Status: DISCONTINUED | OUTPATIENT
Start: 2018-06-11 | End: 2018-06-11 | Stop reason: HOSPADM

## 2018-06-11 RX ORDER — HYDROCODONE BITARTRATE AND ACETAMINOPHEN 5; 325 MG/1; MG/1
1 TABLET ORAL
Qty: 20 TAB | Refills: 0 | Status: SHIPPED | OUTPATIENT
Start: 2018-06-11
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
## 2018-06-11 NOTE — DISCHARGE INSTRUCTIONS
Facial Fracture: Care Instructions  Your Care Instructions  You have broken (fractured) one or more bones in your face. Swelling and bruising from the injury are likely to get worse over the first couple of days. After that, the swelling should steadily improve until it is gone. If you have bruises on your face, they may change as they heal. The skin may turn from black and blue to green to yellow or brown before it returns to its normal color. It may take several weeks for your injury to heal.  It is very important that you get follow-up care as directed so that the injury heals properly and does not lead to problems. The kind of care and treatment you will need depends on the specific type of break (or breaks) you have. You heal best when you take good care of yourself. Eat a variety of healthy foods, and don't smoke. Follow-up care is a key part of your treatment and safety. Be sure to make and go to all appointments, and call your doctor if you are having problems. It's also a good idea to know your test results and keep a list of the medicines you take. How can you care for yourself at home? · Put ice or a cold pack on your injury for 10 to 20 minutes at a time. Try to do this every 1 to 2 hours for the next 3 days (when you are awake) or until the swelling goes down. Put a thin cloth between the ice pack and your skin. · Go to all follow-up appointments with your doctor. Your doctor will determine whether you need further treatment, including surgery. · Take your medicines exactly as prescribed. Call your doctor if you think you are having a problem with your medicine. You will get more details on the specific medicines your doctor prescribes. · If your doctor prescribed antibiotics, take them exactly as directed. Do not stop taking them just because you feel better. You need to take the full course of antibiotics. · Be safe with medicines. Read and follow all instructions on the label.   ¨ If the doctor gave you a prescription medicine for pain, take it as prescribed. ¨ If you are not taking a prescription pain medicine, ask your doctor if you can take an over-the-counter medicine. · Keep your head elevated when you sleep. · Eat soft food to decrease jaw pain. · Do not blow your nose. Dab it with a tissue if you need to. When should you call for help? Call 911 anytime you think you may need emergency care. For example, call if:  ? · You have a seizure. ? · You passed out (lost consciousness). ? · You have tingling, weakness, or numbness on one side of your body. ?Call your doctor now or seek immediate medical care if:  ? · You have a severe headache. ? · You develop double vision. ? · You have a fever and stiff neck. ? · Clear, watery fluid drains from your nose. ? · You feel dizzy or lightheaded. ? · You have new eye pain or changes in your vision, such as blurring. ? · You have new ear pain, ringing in your ears, or trouble hearing. ? · You are confused, irritable, or not acting normally. ? · You have a hard time standing, walking, or talking. ? · You have new mouth or tooth pain, or you have trouble chewing. ? · You have increasing pain even after you have taken your pain medicine. ? Watch closely for changes in your health, and be sure to contact your doctor if:  ? · You develop a cough, cold, or sinus infection. ? · The symptoms from your injury are not steadily improving. Where can you learn more? Go to http://erum-perlita.info/. Enter 0664 [id number] [id number] in the search box to learn more about \"Facial Fracture: Care Instructions. \"  Current as of: October 14, 2016  Content Version: 11.4  © 6630-9499 [employer]. Care instructions adapted under license by [employer] (which disclaims liability or warranty for this information).  If you have questions about a medical condition or this instruction, always ask your healthcare professional. Doctor kinetic, [facility] disclaims any warranty or liability for your use of this information. Head Injury: Care Instructions  Your Care Instructions    Most injuries to the head are minor. Bumps, cuts, and scrapes on the head and face usually heal well and can be treated the same as injuries to other parts of the body. Although it's rare, once in a while a more serious problem shows up after you are home. So it's good to be on the lookout for symptoms for a day or two. Follow-up care is a key part of your treatment and safety. Be sure to make and go to all appointments, and call your doctor if you are having problems. It's also a good idea to know your test results and keep a list of the medicines you take. How can you care for yourself at home? · Follow your doctor's instructions. He or she will tell you if you need someone to watch you closely for the next 24 hours or longer. · Take it easy for the next few days or more if you are not feeling well. · Ask your doctor when it's okay for you to go back to activities like driving a car, riding a bike, or operating machinery. When should you call for help? Call 911 anytime you think you may need emergency care. For example, call if:  ? · You have a seizure. ? · You passed out (lost consciousness). ? · You are confused or can't stay awake. ?Call your doctor now or seek immediate medical care if:  ? · You have new or worse vomiting. ? · You feel less alert. ? · You have new weakness or numbness in any part of your body. ? Watch closely for changes in your health, and be sure to contact your doctor if:  ? · You do not get better as expected. ? · You have new symptoms, such as headaches, trouble concentrating, or changes in mood. Where can you learn more? Go to http://erum-perlita.info/. Enter L806 in the search box to learn more about \"Head Injury: Care Instructions. \"  Current as of: October 14, 2016  Content Version: 11.4  © 1424-8239 [employer]. Care instructions adapted under license by [employer] (which disclaims liability or warranty for this information). If you have questions about a medical condition or this instruction, always ask your healthcare professional. [address] any warranty or liability for your use of this information.

## 2018-10-23 ENCOUNTER — APPOINTMENT (OUTPATIENT)
Dept: GENERAL RADIOLOGY | Age: 48
End: 2018-10-23
Attending: EMERGENCY MEDICINE
Payer: MEDICAID

## 2018-10-23 ENCOUNTER — HOSPITAL ENCOUNTER (EMERGENCY)
Age: 48
Discharge: HOME | End: 2018-10-23
Attending: EMERGENCY MEDICINE
Payer: MEDICAID

## 2018-10-23 VITALS
SYSTOLIC BLOOD PRESSURE: 120 MMHG | TEMPERATURE: 97.8 F | HEART RATE: 71 BPM | HEIGHT: 69 IN | OXYGEN SATURATION: 99 % | BODY MASS INDEX: 23.28 KG/M2 | WEIGHT: 157.19 LBS | RESPIRATION RATE: 16 BRPM | DIASTOLIC BLOOD PRESSURE: 79 MMHG

## 2018-10-23 DIAGNOSIS — S86.912A STRAIN OF LEFT KNEE, INITIAL ENCOUNTER: Primary | ICD-10-CM

## 2018-10-23 PROCEDURE — 99282 EMERGENCY DEPT VISIT SF MDM: CPT

## 2018-10-23 PROCEDURE — 73562 X-RAY EXAM OF KNEE 3: CPT

## 2018-10-23 PROCEDURE — L1830 KO IMMOB CANVAS LONG PRE OTS: HCPCS

## 2018-10-23 RX ORDER — NAPROXEN 500 MG/1
500 TABLET ORAL 2 TIMES DAILY WITH MEALS
Qty: 20 TAB | Refills: 0 | Status: SHIPPED | OUTPATIENT
Start: 2018-10-23 | End: 2018-10-23

## 2018-10-23 RX ORDER — NAPROXEN 500 MG/1
500 TABLET ORAL 2 TIMES DAILY WITH MEALS
Qty: 20 TAB | Refills: 0 | Status: SHIPPED | OUTPATIENT
Start: 2018-10-23 | End: 2018-11-02

## 2018-10-23 NOTE — ED PROVIDER NOTES
57 y.o. male with past medical history significant for back pain who presents from home with chief complaint of knee pain. Pt states as a child he broke his left knee, and has had chronic intermittent pain since that time. Pt states one week ago he heard a \"pop\" in his left knee and has been experiencing an exacerbation of his knee pain since then. He then heard a \"pop and tear\" today after \"turning wrong\" on his left leg and has been experiencing knee swelling, pain, and difficulty bearing weight on the LLE since that time. Pt states he is unable to ambulate normally due to the pain in the knee. Pt denies headaches, chest pain and vomiting. There are no other acute medical concerns at this time. Social hx: (+) Daily tobacco user (0.5 packs/day), No EtOH usage PCP: Liz Villasenor MD 
 
Note written by Mak Fay, as dictated by Hanna Dawkins MD 7:00 PM 
 
 
The history is provided by the patient. No  was used. Past Medical History:  
Diagnosis Date  Back pain Past Surgical History:  
Procedure Laterality Date  HX OTHER SURGICAL Nasal bridge fracture Family History:  
Problem Relation Age of Onset  Diabetes Mother Social History Socioeconomic History  Marital status: SINGLE Spouse name: Not on file  Number of children: Not on file  Years of education: Not on file  Highest education level: Not on file Social Needs  Financial resource strain: Not on file  Food insecurity - worry: Not on file  Food insecurity - inability: Not on file  Transportation needs - medical: Not on file  Transportation needs - non-medical: Not on file Occupational History  Not on file Tobacco Use  Smoking status: Current Every Day Smoker Packs/day: 0.50  Smokeless tobacco: Never Used Substance and Sexual Activity  Alcohol use: Yes Alcohol/week: 0.0 oz  
  Comment: Rarely  Drug use:  No  
  Sexual activity: No  
Other Topics Concern  Not on file Social History Narrative  Not on file ALLERGIES: Tramadol and Tylenol [acetaminophen] Review of Systems Constitutional: Negative for activity change, appetite change and fatigue. HENT: Negative for ear pain, facial swelling, sore throat and trouble swallowing. Eyes: Negative for pain, discharge and visual disturbance. Respiratory: Negative for chest tightness, shortness of breath and wheezing. Cardiovascular: Negative for chest pain and palpitations. Gastrointestinal: Negative for abdominal pain, blood in stool, nausea and vomiting. Genitourinary: Negative for difficulty urinating, flank pain and hematuria. Musculoskeletal: Positive for arthralgias (left knee) and joint swelling (left knee). Negative for myalgias and neck pain. Skin: Negative for color change and rash. Neurological: Negative for dizziness, weakness, numbness and headaches. Hematological: Negative for adenopathy. Does not bruise/bleed easily. Psychiatric/Behavioral: Negative for behavioral problems, confusion and sleep disturbance. All other systems reviewed and are negative. Vitals:  
 10/23/18 1750 BP: 115/80 Pulse: 91  
Resp: 16 Temp: 97.8 °F (36.6 °C) SpO2: 98% Weight: 71.3 kg (157 lb 3 oz) Height: 5' 9\" (1.753 m) Physical Exam  
Constitutional: He is oriented to person, place, and time. He appears well-developed and well-nourished. No distress. HENT:  
Head: Normocephalic and atraumatic. Nose: Nose normal.  
Mouth/Throat: Oropharynx is clear and moist.  
Eyes: Conjunctivae and EOM are normal. Pupils are equal, round, and reactive to light. No scleral icterus. Neck: Normal range of motion. Neck supple. No JVD present. No tracheal deviation present. No thyromegaly present. No carotid bruits noted.   
Cardiovascular: Normal rate, regular rhythm, normal heart sounds and intact distal pulses. Exam reveals no gallop and no friction rub. No murmur heard. Pulmonary/Chest: Effort normal and breath sounds normal. No respiratory distress. He has no wheezes. He has no rales. He exhibits no tenderness. Abdominal: Soft. Bowel sounds are normal. He exhibits no distension and no mass. There is no tenderness. There is no rebound and no guarding. Musculoskeletal: Normal range of motion. He exhibits no edema or tenderness. Slight discomfort over the patella bilaterally. Minimal effusion. No instability. Able to fully straighten the knee. Lymphadenopathy:  
  He has no cervical adenopathy. Neurological: He is alert and oriented to person, place, and time. He has normal reflexes. No cranial nerve deficit. Coordination normal.  
Skin: Skin is warm and dry. No rash noted. No erythema. Psychiatric: He has a normal mood and affect. His behavior is normal. Judgment and thought content normal.  
Nursing note and vitals reviewed. Note written by Mak Warren, as dictated by Kaden Slaughter MD 7:21 PM 
 
 
MDM Number of Diagnoses or Management Options Amount and/or Complexity of Data Reviewed Tests in the radiology section of CPT®: ordered and reviewed Decide to obtain previous medical records or to obtain history from someone other than the patient: yes Review and summarize past medical records: yes Independent visualization of images, tracings, or specimens: yes Risk of Complications, Morbidity, and/or Mortality Presenting problems: moderate Diagnostic procedures: moderate Management options: moderate Patient Progress Patient progress: stable Procedures Home to rest and use cool compresses to the left knee four times a day for 20 minutes at a time. Minimize weight bearing as needed and see your own MD if continued difficulty. Naprosyn for pain

## 2018-10-23 NOTE — ED TRIAGE NOTES
Patient arrives c/o L knee pain. Patient states its always bad but a couple hours ago he felt a pop and now it is swelling. Unable to visualize in triage. Patient ambulatory.

## 2018-12-30 ENCOUNTER — HOSPITAL ENCOUNTER (EMERGENCY)
Age: 48
Discharge: HOME | End: 2018-12-30
Attending: EMERGENCY MEDICINE
Payer: MEDICAID

## 2018-12-30 VITALS
RESPIRATION RATE: 16 BRPM | OXYGEN SATURATION: 99 % | WEIGHT: 165 LBS | SYSTOLIC BLOOD PRESSURE: 126 MMHG | BODY MASS INDEX: 24.44 KG/M2 | DIASTOLIC BLOOD PRESSURE: 80 MMHG | HEART RATE: 83 BPM | HEIGHT: 69 IN | TEMPERATURE: 98.3 F

## 2018-12-30 DIAGNOSIS — J34.89 SINUS PRESSURE: Primary | ICD-10-CM

## 2018-12-30 PROCEDURE — 99282 EMERGENCY DEPT VISIT SF MDM: CPT

## 2018-12-30 RX ORDER — AMOXICILLIN AND CLAVULANATE POTASSIUM 875; 125 MG/1; MG/1
1 TABLET, FILM COATED ORAL 2 TIMES DAILY
Qty: 14 TAB | Refills: 0 | Status: SHIPPED | OUTPATIENT
Start: 2018-12-30 | End: 2019-01-06

## 2018-12-30 RX ORDER — FLUTICASONE PROPIONATE 50 MCG
2 SPRAY, SUSPENSION (ML) NASAL DAILY
Qty: 1 BOTTLE | Refills: 0 | Status: SHIPPED | OUTPATIENT
Start: 2018-12-30

## 2018-12-30 NOTE — ED PROVIDER NOTES
57 y.o. male with no significant past medical history presents with complaints of sinus pressure which began a 4 days ago. The pt states \"I feel congested and my face hurts. \"  Denies trying anything otc for his symptoms. Denies headache, chest pressure, nausea, vomiting, or diarrhea. There are no other acute medical complaints at this time. PCP: MD Yakov Pacheco PA-C Past Medical History:  
Diagnosis Date  Back pain Past Surgical History:  
Procedure Laterality Date  HX OTHER SURGICAL Nasal bridge fracture Family History:  
Problem Relation Age of Onset  Diabetes Mother Social History Socioeconomic History  Marital status: SINGLE Spouse name: Not on file  Number of children: Not on file  Years of education: Not on file  Highest education level: Not on file Social Needs  Financial resource strain: Not on file  Food insecurity - worry: Not on file  Food insecurity - inability: Not on file  Transportation needs - medical: Not on file  Transportation needs - non-medical: Not on file Occupational History  Not on file Tobacco Use  Smoking status: Current Every Day Smoker Packs/day: 0.50  Smokeless tobacco: Never Used Substance and Sexual Activity  Alcohol use: Yes Alcohol/week: 0.0 oz  
  Comment: Rarely  Drug use: No  
 Sexual activity: No  
Other Topics Concern  Not on file Social History Narrative  Not on file ALLERGIES: Tramadol and Tylenol [acetaminophen] Review of Systems Constitutional: Negative for chills, diaphoresis and fever. HENT: Positive for sinus pressure. Negative for congestion, postnasal drip, rhinorrhea and sore throat. Eyes: Negative for photophobia, discharge, redness and visual disturbance. Respiratory: Negative for cough, chest tightness, shortness of breath and wheezing. Cardiovascular: Negative for chest pain, palpitations and leg swelling. Gastrointestinal: Negative for abdominal distention, abdominal pain, blood in stool, constipation, diarrhea, nausea and vomiting. Genitourinary: Negative for difficulty urinating, dysuria, frequency, hematuria and urgency. Musculoskeletal: Negative for arthralgias, back pain, joint swelling and myalgias. Skin: Negative for color change and rash. Neurological: Negative for dizziness, speech difficulty, weakness, light-headedness, numbness and headaches. Psychiatric/Behavioral: Negative for confusion. The patient is not nervous/anxious. All other systems reviewed and are negative. Vitals:  
 12/30/18 1250 12/30/18 1333 BP:  126/80 Pulse: 90 83 Resp:  16 Temp:  98.3 °F (36.8 °C) SpO2: 100% 99% Weight:  74.8 kg (165 lb) Height:  5' 9\" (1.753 m) Physical Exam  
Constitutional: He is oriented to person, place, and time. He appears well-developed and well-nourished. No distress. HENT:  
Head: Normocephalic and atraumatic. Head is without raccoon's eyes, without Luna's sign and without laceration. Right Ear: Hearing, tympanic membrane, external ear and ear canal normal. No foreign bodies. Tympanic membrane is not bulging. No hemotympanum. Left Ear: Hearing, tympanic membrane, external ear and ear canal normal. No foreign bodies. Tympanic membrane is not bulging. No hemotympanum. Nose: Nose normal. No mucosal edema or rhinorrhea. Right sinus exhibits no maxillary sinus tenderness and no frontal sinus tenderness. Left sinus exhibits no maxillary sinus tenderness and no frontal sinus tenderness. Mouth/Throat: Uvula is midline, oropharynx is clear and moist and mucous membranes are normal. No tonsillar abscesses. Eyes: Conjunctivae and EOM are normal. Pupils are equal, round, and reactive to light. Right eye exhibits no discharge. Left eye exhibits no discharge. Neck: Normal range of motion. Neck supple. Cardiovascular: Normal rate, regular rhythm and normal heart sounds. Exam reveals no gallop and no friction rub. No murmur heard. Regular rate and rhythm. No murmurs, gallops, rubs, or clicks. Pulmonary/Chest: Effort normal and breath sounds normal. No respiratory distress. He has no wheezes. He has no rales. No stridor or wheezes. No accessory muscle usage. No nasal flaring. Breath Sounds equal bilaterally. Abdominal: Soft. Bowel sounds are normal. He exhibits no distension. There is no tenderness. There is no rebound and no guarding. No abdominal Bruits. No pulsatile mass. No abdominal scars. Active bowel sounds. Musculoskeletal: Normal range of motion. He exhibits no edema, tenderness or deformity. Neurological: He is alert and oriented to person, place, and time. Skin: He is not diaphoretic. Nursing note and vitals reviewed. MDM Number of Diagnoses or Management Options Sinus pressure:  
Diagnosis management comments: Pt afebrile and nontoxic appearing. Will cover with abx and advised close follow up with his ENT at [facility]. Pt given strict return precautions. Franky Vick PA-C Procedures

## 2018-12-30 NOTE — ED TRIAGE NOTES
Pt reports sinus pain and pressure that began a few days ago. Pt denies fever. Temp in triage 98. 3. HX of sinus surgery.

## 2018-12-30 NOTE — DISCHARGE INSTRUCTIONS
We hope that we have addressed all of your medical concerns. The examination and treatment you received in the Emergency Department were for an emergent problem and were not intended as complete care. It is important that you follow up with your healthcare provider(s) for ongoing care. If your symptoms worsen or do not improve as expected, and you are unable to reach your usual health care provider(s), you should return to the Emergency Department. Today's healthcare is undergoing tremendous change, and patient satisfaction surveys are one of the many tools to assess the quality of medical care. You may receive a survey from the [employer] regarding your experience in the Emergency Department. I hope that your experience has been completely positive, particularly the medical care that I provided. As such, please participate in the survey; anything less than excellent does not meet my expectations or intentions. [facility] and [address] [place] [address] participate in nationally recognized quality of care measures. If your blood pressure is greater than 120/80, as reported below, we urge that you seek medical care to address the potential of high blood pressure, commonly known as hypertension. Hypertension can be hereditary or can be caused by certain medical conditions, pain, stress, or \"white coat syndrome. \"       Please make an appointment with your health care provider(s) for follow up of your Emergency Department visit. VITALS:   Patient Vitals for the past 8 hrs:   Pulse SpO2   12/30/18 1250 90 100 %          Thank you for allowing us to provide you with medical care today. We realize that you have many choices for your emergency care needs. Please choose us in the future for any continued health care needs. [facility], [address] [facility].   Office: [phone]            No results found for this or any previous visit (from the past 24 hour(s)). No results found.

## 2019-01-11 ENCOUNTER — APPOINTMENT (OUTPATIENT)
Dept: GENERAL RADIOLOGY | Age: 49
End: 2019-01-11
Attending: PHYSICIAN ASSISTANT
Payer: MEDICAID

## 2019-01-11 ENCOUNTER — HOSPITAL ENCOUNTER (EMERGENCY)
Age: 49
Discharge: HOME | End: 2019-01-11
Attending: EMERGENCY MEDICINE | Admitting: EMERGENCY MEDICINE
Payer: MEDICAID

## 2019-01-11 VITALS
SYSTOLIC BLOOD PRESSURE: 133 MMHG | DIASTOLIC BLOOD PRESSURE: 89 MMHG | HEART RATE: 75 BPM | OXYGEN SATURATION: 99 % | TEMPERATURE: 97.9 F | RESPIRATION RATE: 16 BRPM

## 2019-01-11 DIAGNOSIS — G89.29 CHRONIC LEFT SHOULDER PAIN: Primary | ICD-10-CM

## 2019-01-11 DIAGNOSIS — M25.512 CHRONIC LEFT SHOULDER PAIN: Primary | ICD-10-CM

## 2019-01-11 PROCEDURE — 99283 EMERGENCY DEPT VISIT LOW MDM: CPT

## 2019-01-11 PROCEDURE — 73030 X-RAY EXAM OF SHOULDER: CPT

## 2019-01-11 RX ORDER — CYCLOBENZAPRINE HCL 5 MG
5 TABLET ORAL 2 TIMES DAILY
Qty: 30 TAB | Refills: 0 | Status: SHIPPED | OUTPATIENT
Start: 2019-01-11

## 2019-01-11 RX ORDER — METHYLPREDNISOLONE 4 MG/1
TABLET ORAL
Qty: 1 DOSE PACK | Refills: 0 | Status: SHIPPED | OUTPATIENT
Start: 2019-01-11

## 2019-01-11 NOTE — ED NOTES
Pt ambulatory out of ED with discharge instructions and prescriptions in hand given by ALEAH Elizabeth; pt verbalized understanding of discharge paperwork and time allotted for questions. VSS. Pt alert and oriented.

## 2019-01-11 NOTE — ED PROVIDER NOTES
57 y.o. male with no significant past medical history presents with complaints of left shoulder pain. The pt states that \"I fell off of a ladder several years ago, and I have had shoulder issues ever since. The pt states \"my shoulder is flaring up again and it is keeping me from sleeping at night. The last time I was seen for this I was told to follow up with the ortho doctor for an MRI but I never did. I think now its time. \"  The pt states that movement makes the pain worse. The pt rates the pain as a 6/10 in severity. The pt describes the pain as a dull ache. The pt denies taking anything at home for the discomfort. There are no other acute medical complaints at this time. PCP: Melvin Dumont MD 
 
Christie , PASonyC Past Medical History:  
Diagnosis Date  Back pain Past Surgical History:  
Procedure Laterality Date  HX OTHER SURGICAL Nasal bridge fracture Family History:  
Problem Relation Age of Onset  Diabetes Mother Social History Socioeconomic History  Marital status: SINGLE Spouse name: Not on file  Number of children: Not on file  Years of education: Not on file  Highest education level: Not on file Social Needs  Financial resource strain: Not on file  Food insecurity - worry: Not on file  Food insecurity - inability: Not on file  Transportation needs - medical: Not on file  Transportation needs - non-medical: Not on file Occupational History  Not on file Tobacco Use  Smoking status: Current Every Day Smoker Packs/day: 0.50  Smokeless tobacco: Never Used Substance and Sexual Activity  Alcohol use: Yes Alcohol/week: 0.0 oz  
  Comment: Rarely  Drug use: No  
 Sexual activity: No  
Other Topics Concern  Not on file Social History Narrative  Not on file ALLERGIES: Tramadol and Tylenol [acetaminophen] Review of Systems Constitutional: Negative for chills, diaphoresis and fever. HENT: Negative for congestion, postnasal drip, rhinorrhea and sore throat. Eyes: Negative for photophobia, discharge, redness and visual disturbance. Respiratory: Negative for cough, chest tightness, shortness of breath and wheezing. Cardiovascular: Negative for chest pain, palpitations and leg swelling. Gastrointestinal: Negative for abdominal distention, abdominal pain, blood in stool, constipation, diarrhea, nausea and vomiting. Genitourinary: Negative for difficulty urinating, dysuria, frequency, hematuria and urgency. Musculoskeletal: Positive for arthralgias and myalgias. Negative for back pain and joint swelling. Skin: Negative for color change and rash. Neurological: Negative for dizziness, speech difficulty, weakness, light-headedness, numbness and headaches. Psychiatric/Behavioral: Negative for confusion. The patient is not nervous/anxious. All other systems reviewed and are negative. Vitals:  
 01/11/19 1108 01/11/19 1146 01/11/19 1225 BP:  127/79 133/89 Pulse: 88 75 75 Resp:  16 16 Temp:  97.9 °F (36.6 °C) 97.9 °F (36.6 °C) SpO2: 97% 98% 99% Physical Exam  
Constitutional: He is oriented to person, place, and time. He appears well-developed and well-nourished. No distress. HENT:  
Head: Normocephalic and atraumatic. Head is without raccoon's eyes, without Luna's sign and without laceration. Right Ear: Hearing, tympanic membrane, external ear and ear canal normal. No foreign bodies. Tympanic membrane is not bulging. No hemotympanum. Left Ear: Hearing, tympanic membrane, external ear and ear canal normal. No foreign bodies. Tympanic membrane is not bulging. No hemotympanum. Nose: Nose normal. No mucosal edema or rhinorrhea. Right sinus exhibits no maxillary sinus tenderness and no frontal sinus tenderness.  Left sinus exhibits no maxillary sinus tenderness and no frontal sinus tenderness. Mouth/Throat: Uvula is midline, oropharynx is clear and moist and mucous membranes are normal. No tonsillar abscesses. Eyes: Conjunctivae and EOM are normal. Pupils are equal, round, and reactive to light. Right eye exhibits no discharge. Left eye exhibits no discharge. Neck: Normal range of motion. Neck supple. Cardiovascular: Normal rate, regular rhythm and normal heart sounds. Exam reveals no gallop and no friction rub. No murmur heard. Regular rate and rhythm. No murmurs, gallops, rubs, or clicks. Pulmonary/Chest: Effort normal and breath sounds normal. No respiratory distress. He has no wheezes. He has no rales. No stridor or wheezes. No accessory muscle usage. No nasal flaring. Breath Sounds equal bilaterally. Musculoskeletal: Normal range of motion. He exhibits no edema, tenderness or deformity. Pt with FROM at the left shoulder. No redness, warmth, or signs of infection. No palpable deformities noted. Pt is NVI. Neurological: He is alert and oriented to person, place, and time. Skin: He is not diaphoretic. Nursing note and vitals reviewed. MDM Number of Diagnoses or Management Options Chronic left shoulder pain:  
Diagnosis management comments: Imaging of the left shoulder unremarkable. Will tx symptomatically for muscle strain. No signs of ACS, PE, carotid artery dissection or any other acute life threatening emergencies. Pt advised to follow up with orthopedic surgeon for further evaluation of symptoms. Kemar Guzmán PA-C Procedures

## 2019-01-11 NOTE — DISCHARGE INSTRUCTIONS
We hope that we have addressed all of your medical concerns. The examination and treatment you received in the Emergency Department were for an emergent problem and were not intended as complete care. It is important that you follow up with your healthcare provider(s) for ongoing care. If your symptoms worsen or do not improve as expected, and you are unable to reach your usual health care provider(s), you should return to the Emergency Department. Today's healthcare is undergoing tremendous change, and patient satisfaction surveys are one of the many tools to assess the quality of medical care. You may receive a survey from the [employer] regarding your experience in the Emergency Department. I hope that your experience has been completely positive, particularly the medical care that I provided. As such, please participate in the survey; anything less than excellent does not meet my expectations or intentions. [facility] and 8 [facility] participate in nationally recognized quality of care measures. If your blood pressure is greater than 120/80, as reported below, we urge that you seek medical care to address the potential of high blood pressure, commonly known as hypertension. Hypertension can be hereditary or can be caused by certain medical conditions, pain, stress, or \"white coat syndrome. \"       Please make an appointment with your health care provider(s) for follow up of your Emergency Department visit. VITALS:   Patient Vitals for the past 8 hrs:   Temp Pulse Resp BP SpO2   01/11/19 1146 97.9 °F (36.6 °C) 75 16 127/79 98 %   01/11/19 1108 -- 88 -- -- 97 %          Thank you for allowing us to provide you with medical care today. We realize that you have many choices for your emergency care needs. Please choose us in the future for any continued health care needs. Pilar Lipscomb, [address] [employer] [facility] [place]: [phone]            No results found for this or any previous visit (from the past 24 hour(s)). Xr Shoulder Lt Ap/lat Min 2 V    Result Date: 1/11/2019  EXAM: XR SHOULDER LT AP/LAT MIN 2 V INDICATION: left shoulder pain. FINDINGS: Three views of the left shoulder demonstrate no fracture, dislocation or other acute abnormality. There is no significant arthritis. There is no rotator cuff calcification. IMPRESSION: No acute abnormality.

## 2019-01-11 NOTE — ED TRIAGE NOTES
Pt c/o of left shoulder pain, keep him up at night. Hx of shoulder injury r/t falling off ladder years ago.

## 2019-03-22 ENCOUNTER — APPOINTMENT (OUTPATIENT)
Dept: CT IMAGING | Age: 49
End: 2019-03-22
Attending: PHYSICIAN ASSISTANT
Payer: MEDICAID

## 2019-03-22 ENCOUNTER — HOSPITAL ENCOUNTER (EMERGENCY)
Age: 49
Discharge: HOME | End: 2019-03-22
Attending: EMERGENCY MEDICINE
Payer: MEDICAID

## 2019-03-22 VITALS
OXYGEN SATURATION: 97 % | DIASTOLIC BLOOD PRESSURE: 94 MMHG | RESPIRATION RATE: 14 BRPM | HEART RATE: 69 BPM | SYSTOLIC BLOOD PRESSURE: 131 MMHG | TEMPERATURE: 97.8 F

## 2019-03-22 DIAGNOSIS — R51.9 LEFT FACIAL PAIN: Primary | ICD-10-CM

## 2019-03-22 DIAGNOSIS — Z87.81 PERSONAL HISTORY OF (HEALED) TRAUMATIC FRACTURE: ICD-10-CM

## 2019-03-22 PROCEDURE — 70486 CT MAXILLOFACIAL W/O DYE: CPT

## 2019-03-22 PROCEDURE — 99283 EMERGENCY DEPT VISIT LOW MDM: CPT

## 2019-03-22 RX ORDER — HYDROCODONE BITARTRATE AND ACETAMINOPHEN 5; 325 MG/1; MG/1
1 TABLET ORAL
Qty: 12 TAB | Refills: 0 | Status: SHIPPED | OUTPATIENT
Start: 2019-03-22 | End: 2019-03-29

## 2019-03-22 NOTE — DISCHARGE INSTRUCTIONS
We hope that we have addressed all of your medical concerns. The examination and treatment you received in the Emergency Department were for an emergent problem and were not intended as complete care. It is important that you follow up with your healthcare provider(s) for ongoing care. If your symptoms worsen or do not improve as expected, and you are unable to reach your usual health care provider(s), you should return to the Emergency Department. Today's healthcare is undergoing tremendous change, and patient satisfaction surveys are one of the many tools to assess the quality of medical care. You may receive a survey from the [employer] regarding your experience in the Emergency Department. I hope that your experience has been completely positive, particularly the medical care that I provided. As such, please participate in the survey; anything less than excellent does not meet my expectations or intentions. [facility] and 8 [facility] participate in nationally recognized quality of care measures. If your blood pressure is greater than 120/80, as reported below, we urge that you seek medical care to address the potential of high blood pressure, commonly known as hypertension. Hypertension can be hereditary or can be caused by certain medical conditions, pain, stress, or \"white coat syndrome. \"       Please make an appointment with your health care provider(s) for follow up of your Emergency Department visit. VITALS:   Patient Vitals for the past 8 hrs:   Temp Pulse Resp BP SpO2   03/22/19 1123 -- -- -- -- 99 %   03/22/19 1117 97.6 °F (36.4 °C) 77 14 126/86 99 %   03/22/19 1036 -- 89 -- -- 100 %          Thank you for allowing us to provide you with medical care today. We realize that you have many choices for your emergency care needs. Please choose us in the future for any continued health care needs.       Regards, Jorje Neville. Chris, [address] [place] [address] Avenue: [phone]            No results found for this or any previous visit (from the past 24 hour(s)). Ct Maxillofacial Wo Cont    Result Date: 3/22/2019  EXAM: CT MAXILLOFACIAL WO CONT INDICATION: facial pain, left and left cheek pain, status post trauma 6 months ago COMPARISON: CT June 2018. CONTRAST:   None. TECHNIQUE:  Multislice helical CT of the facial bones was performed in the axial plane without intravenous contrast administration. Coronal and sagittal reformations were generated. CT dose reduction was achieved through use of a standardized protocol tailored for this examination and automatic exposure control for dose modulation. FINDINGS: There are old ununited nasal fractures, as well as an old deformity of the anterior wall of the left maxillary sinus. These are similar in appearance to the prior study, compatible with chronic injuries. There is no evidence of superimposed acute abnormality. There is an old chronic deformity of the left zygomatic arch as well. There is evidence of prior functional endoscopic sinus surgery. There is mild mucosal thickening of the right maxillary sinus. The globes, optic nerves and extraocular muscles are normal. No abnormalities are identified within the visualized portions of the brain or nasopharynx. IMPRESSION: 1. Old fracture deformities of the nasal bones, anterior wall of the left maxillary sinus, and left zygomatic arch. 2. No superimposed acute facial fracture identified. Patient Education        Head or Face Pain: Care Instructions  Your Care Instructions    Common causes of head or face pain are allergies, stress, and injuries. Other causes include tooth problems and sinus infections. Eating certain foods, such as chocolate or cheese, or drinking certain liquids, such as coffee or cola, can cause head pain for some people.   If you have mild head pain, you may not need treatment. It is important to watch your symptoms and talk to your doctor if your pain continues or gets worse. Follow-up care is a key part of your treatment and safety. Be sure to make and go to all appointments, and call your doctor if you are having problems. It's also a good idea to know your test results and keep a list of the medicines you take. How can you care for yourself at home? · Take pain medicines exactly as directed. ? If the doctor gave you a prescription medicine for pain, take it as prescribed. ? If you are not taking a prescription pain medicine, ask your doctor if you can take an over-the-counter pain medicine. · Take it easy for the next few days or longer if you are not feeling well. · Use a warm, moist towel or heating pad set on low to relax tight muscles in your shoulder and neck. Have someone gently massage your neck and shoulders. · Put ice or a cold pack on the area for 10 to 20 minutes at a time. Put a thin cloth between the ice and your skin. When should you call for help? Call 911 anytime you think you may need emergency care. For example, call if:    · You have twitching, jerking, or a seizure.     · You passed out (lost consciousness).     · You have symptoms of a stroke. These may include:  ? Sudden numbness, tingling, weakness, or loss of movement in your face, arm, or leg, especially on only one side of your body. ? Sudden vision changes. ? Sudden trouble speaking. ? Sudden confusion or trouble understanding simple statements. ? Sudden problems with walking or balance.   ? A sudden, severe headache that is different from past headaches.     · You have jaw pain and pain in your chest, shoulder, neck, or arm.    Call your doctor now or seek immediate medical care if:    · You have a fever with a stiff neck or a severe headache.     · You have nausea and vomiting, or you cannot keep food or liquids down.    Watch closely for changes in your health, and be sure to contact your doctor if:    · Your head or face pain does not get better as expected. Where can you learn more? Go to http://erum-perlita.info/. Enter P568 in the search box to learn more about \"Head or Face Pain: Care Instructions. \"  Current as of: September 23, 2018  Content Version: 11.9  © 8830-4030 [employer]. Care instructions adapted under license by [employer] (which disclaims liability or warranty for this information). If you have questions about a medical condition or this instruction, always ask your healthcare professional. [place] [address] any warranty or liability for your use of this information.
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
## 2020-08-15 VITALS
TEMPERATURE: 98.9 F | RESPIRATION RATE: 18 BRPM | HEART RATE: 68 BPM | SYSTOLIC BLOOD PRESSURE: 142 MMHG | OXYGEN SATURATION: 98 % | DIASTOLIC BLOOD PRESSURE: 97 MMHG

## 2020-08-15 PROCEDURE — 99282 EMERGENCY DEPT VISIT SF MDM: CPT

## 2020-08-16 ENCOUNTER — APPOINTMENT (OUTPATIENT)
Dept: GENERAL RADIOLOGY | Age: 50
End: 2020-08-16
Attending: PHYSICIAN ASSISTANT
Payer: MEDICAID

## 2020-08-16 ENCOUNTER — HOSPITAL ENCOUNTER (EMERGENCY)
Age: 50
Discharge: HOME | End: 2020-08-16
Attending: EMERGENCY MEDICINE
Payer: MEDICAID

## 2020-08-16 DIAGNOSIS — J20.9 ACUTE BRONCHITIS, UNSPECIFIED ORGANISM: Primary | ICD-10-CM

## 2020-08-16 PROCEDURE — 71045 X-RAY EXAM CHEST 1 VIEW: CPT

## 2020-08-16 PROCEDURE — 74011250637 HC RX REV CODE- 250/637: Performed by: PHYSICIAN ASSISTANT

## 2020-08-16 RX ORDER — DOXYCYCLINE HYCLATE 100 MG
100 TABLET ORAL 2 TIMES DAILY
Qty: 20 TAB | Refills: 0 | Status: SHIPPED | OUTPATIENT
Start: 2020-08-16 | End: 2020-08-26

## 2020-08-16 RX ORDER — PREDNISONE 10 MG/1
TABLET ORAL
Qty: 48 TAB | Refills: 0 | Status: SHIPPED | OUTPATIENT
Start: 2020-08-16

## 2020-08-16 RX ORDER — DOXYCYCLINE HYCLATE 100 MG
100 TABLET ORAL 2 TIMES DAILY
Qty: 20 TAB | Refills: 0 | Status: SHIPPED | OUTPATIENT
Start: 2020-08-16 | End: 2020-08-16

## 2020-08-16 RX ORDER — IBUPROFEN 400 MG/1
800 TABLET ORAL
Status: COMPLETED | OUTPATIENT
Start: 2020-08-16 | End: 2020-08-16

## 2020-08-16 RX ORDER — ALBUTEROL SULFATE 90 UG/1
1 AEROSOL, METERED RESPIRATORY (INHALATION)
Qty: 1 INHALER | Refills: 0 | Status: SHIPPED | OUTPATIENT
Start: 2020-08-16

## 2020-08-16 RX ORDER — ONDANSETRON 4 MG/1
8 TABLET, ORALLY DISINTEGRATING ORAL
Status: COMPLETED | OUTPATIENT
Start: 2020-08-16 | End: 2020-08-16

## 2020-08-16 RX ADMIN — IBUPROFEN 800 MG: 400 TABLET, FILM COATED ORAL at 00:52

## 2020-08-16 RX ADMIN — ONDANSETRON 8 MG: 4 TABLET, ORALLY DISINTEGRATING ORAL at 00:52

## 2020-08-16 NOTE — ED TRIAGE NOTES
Triage: Pt arrives from home with CC of cough, generalized body aches, chills, and nasal congestion. Pt hasn't taken his temperature but feels like he has had a fever. Denies contact with anyone with similar symptoms.

## 2020-08-16 NOTE — ED PROVIDER NOTES
Denia Cole is a 52 y.o. male  who presents by private vehicle to ER with c/o Patient presents with:  Generalized Body Aches  Flu Like Symptoms  Patient presents with complaints of cough, generalized body aches, chills, and nasal congestion. Patient reports feeling feverish, however has not had a temperature at home. Patient is smoker. Denies any sick contacts. He specifically denies any fevers, chills, nausea, vomiting, chest pain, shortness of breath, headache, rash, diarrhea, abdominal pain, urinary/bowel changes, sweating or weight loss. PCP: Mendoza Mathis MD   PMHx significant for: Past Medical History:  No date: Back pain   PSHx significant for: Past Surgical History:  No date: HX OTHER SURGICAL      Comment:  Nasal bridge fracture  Social Hx: Tobacco use: Social History    Tobacco Use      Smoking status: Current Every Day Smoker        Packs/day: 0.50      Smokeless tobacco: Never Used  ; EtOH use: The patient states he drinks 0 per week.; Illicit Drug use: Allergies:   -- Tramadol -- Other (comments)    --  Stomach pain. -- Tylenol (Acetaminophen) -- Nausea Only    There are no other complaints, changes or physical findings at this time.               Past Medical History:   Diagnosis Date    Back pain        Past Surgical History:   Procedure Laterality Date    HX OTHER SURGICAL      Nasal bridge fracture         Family History:   Problem Relation Age of Onset    Diabetes Mother        Social History     Socioeconomic History    Marital status: SINGLE     Spouse name: Not on file    Number of children: Not on file    Years of education: Not on file    Highest education level: Not on file   Occupational History    Not on file   Social Needs    Financial resource strain: Not on file    Food insecurity     Worry: Not on file     Inability: Not on file    Transportation needs     Medical: Not on file     Non-medical: Not on file   Tobacco Use    Smoking status: Current Every Day Smoker     Packs/day: 0.50    Smokeless tobacco: Never Used   Substance and Sexual Activity    Alcohol use: Yes     Alcohol/week: 0.0 standard drinks     Comment: Rarely    Drug use: No    Sexual activity: Never   Lifestyle    Physical activity     Days per week: Not on file     Minutes per session: Not on file    Stress: Not on file   Relationships    Social connections     Talks on phone: Not on file     Gets together: Not on file     Attends Anabaptist service: Not on file     Active member of club or organization: Not on file     Attends meetings of clubs or organizations: Not on file     Relationship status: Not on file    Intimate partner violence     Fear of current or ex partner: Not on file     Emotionally abused: Not on file     Physically abused: Not on file     Forced sexual activity: Not on file   Other Topics Concern    Not on file   Social History Narrative    Not on file         ALLERGIES: Tramadol and Tylenol [acetaminophen]    Review of Systems   Constitutional: Negative for activity change, appetite change, chills and fever. HENT: Positive for congestion. Negative for sore throat. Respiratory: Positive for cough. Negative for shortness of breath. Cardiovascular: Negative for chest pain. Gastrointestinal: Negative for abdominal pain, diarrhea, nausea and vomiting. Genitourinary: Negative for dysuria. Musculoskeletal: Positive for myalgias. Negative for arthralgias. Skin: Negative for color change. Neurological: Negative for dizziness. Psychiatric/Behavioral: The patient is not nervous/anxious. All other systems reviewed and are negative. Vitals:    08/15/20 2338   BP: (!) 142/97   Pulse: 68   Resp: 18   Temp: 98.9 °F (37.2 °C)   SpO2: 98%            Physical Exam  Vitals signs and nursing note reviewed. Constitutional:       General: He is not in acute distress. Appearance: He is well-developed. He is not ill-appearing, toxic-appearing or diaphoretic. HENT:      Head: Normocephalic and atraumatic. Right Ear: External ear normal.      Left Ear: External ear normal.      Nose: Congestion present. Mouth/Throat:      Pharynx: No oropharyngeal exudate. Eyes:      General: No scleral icterus. Right eye: No discharge. Left eye: No discharge. Conjunctiva/sclera: Conjunctivae normal.      Pupils: Pupils are equal, round, and reactive to light. Neck:      Musculoskeletal: Normal range of motion and neck supple. Thyroid: No thyromegaly. Trachea: No tracheal deviation. Cardiovascular:      Rate and Rhythm: Normal rate and regular rhythm. Heart sounds: Normal heart sounds. No murmur. Pulmonary:      Effort: Pulmonary effort is normal. No respiratory distress. Breath sounds: Normal breath sounds. No wheezing or rales. Abdominal:      General: Bowel sounds are normal. There is no distension. Palpations: Abdomen is soft. Tenderness: There is no abdominal tenderness. There is no guarding or rebound. Musculoskeletal: Normal range of motion. General: No tenderness. Lymphadenopathy:      Cervical: No cervical adenopathy. Skin:     General: Skin is warm. Findings: No erythema or rash. Neurological:      Mental Status: He is alert and oriented to person, place, and time. Cranial Nerves: No cranial nerve deficit. Coordination: Coordination normal.   Psychiatric:         Behavior: Behavior normal.         Thought Content: Thought content normal.         Judgment: Judgment normal.          MDM  Number of Diagnoses or Management Options  Acute bronchitis, unspecified organism:   Diagnosis management comments: Assesment/Plan- 52 y.o. Patient presents with:  Generalized Body Aches  Flu Like Symptoms  differential includes: bronchitis, pneumonia, viral illness. Labs and imaging review with xray showing bronchitis. Patient is well appearing, afebrile and tolerating PO.  Recommend PCP follow up. Patient educated on reasons to return to the ED.            Procedures

## 2020-08-16 NOTE — DISCHARGE INSTRUCTIONS
Patient Education        Bronchitis: Care Instructions  Your Care Instructions     Bronchitis is inflammation of the bronchial tubes, which carry air to the lungs. The tubes swell and produce mucus, or phlegm. The mucus and inflamed bronchial tubes make you cough. You may have trouble breathing. Most cases of bronchitis are caused by viruses like those that cause colds. Antibiotics usually do not help and they may be harmful. Bronchitis usually develops rapidly and lasts about 2 to 3 weeks in otherwise healthy people. Follow-up care is a key part of your treatment and safety. Be sure to make and go to all appointments, and call your doctor if you are having problems. It's also a good idea to know your test results and keep a list of the medicines you take. How can you care for yourself at home? · Take all medicines exactly as prescribed. Call your doctor if you think you are having a problem with your medicine. · Get some extra rest.  · Take an over-the-counter pain medicine, such as acetaminophen (Tylenol), ibuprofen (Advil, Motrin), or naproxen (Aleve) to reduce fever and relieve body aches. Read and follow all instructions on the label. · Do not take two or more pain medicines at the same time unless the doctor told you to. Many pain medicines have acetaminophen, which is Tylenol. Too much acetaminophen (Tylenol) can be harmful. · Take an over-the-counter cough medicine that contains dextromethorphan to help quiet a dry, hacking cough so that you can sleep. Avoid cough medicines that have more than one active ingredient. Read and follow all instructions on the label. · Breathe moist air from a humidifier, hot shower, or sink filled with hot water. The heat and moisture will thin mucus so you can cough it out. · Do not smoke. Smoking can make bronchitis worse. If you need help quitting, talk to your doctor about stop-smoking programs and medicines.  These can increase your chances of quitting for good.  When should you call for help? [id number] anytime you think you may need emergency care. For example, call if:  · You have severe trouble breathing. Call your doctor now or seek immediate medical care if:  · You have new or worse trouble breathing. · You cough up dark brown or bloody mucus (sputum). · You have a new or higher fever. · You have a new rash. Watch closely for changes in your health, and be sure to contact your doctor if:  · You cough more deeply or more often, especially if you notice more mucus or a change in the color of your mucus. · You are not getting better as expected. Where can you learn more? Go to http://erum-perlita.info/  Enter H333 in the search box to learn more about \"Bronchitis: Care Instructions. \"  Current as of: February 24, 2020               Content Version: 12.5  © 4316-1319 [employer]. Care instructions adapted under license by [employer] (which disclaims liability or warranty for this information). If you have questions about a medical condition or this instruction, always ask your healthcare professional. [address] any warranty or liability for your use of this information.

## 2020-08-17 ENCOUNTER — PATIENT OUTREACH (OUTPATIENT)
Dept: CASE MANAGEMENT | Age: 50
End: 2020-08-17

## 2020-08-17 NOTE — PROGRESS NOTES
SOB/Cough/Body Aches/Acute Bronchitis  Patient on Cov19 D/C ROBBIE Enrollment Report/fu Contact. Unable to LM on VM or with person with my contact information for return call. Need to assess for d/c needs post ED or Inpatient Admission.

## 2020-08-26 ENCOUNTER — PATIENT OUTREACH (OUTPATIENT)
Dept: CASE MANAGEMENT | Age: 50
End: 2020-08-26

## 2020-08-26 NOTE — PROGRESS NOTES
ED SOB/Cough/Body Aches/Acute Bronchitis f/u    Patient resolved from Transition of Care episode on 8/26/2020. ACM/CTN was unsuccessful at contacting this patient today. Patient/family was not provided due to inability to contact, the following resources and education related to COVID-19 during the initial call:                         Signs, symptoms and red flags related to COVID-19            CDC exposure and quarantine guidelines            [facility] exposure contact - [phone]            Contact for their local Department of Health                 Patient has not had any additional ED or hospital visits. No further outreach scheduled with this CTN/ACM. Episode of Care resolved. Patient has this CTN/ACM contact information if future needs arise.

## 2021-02-03 ENCOUNTER — HOSPITAL ENCOUNTER (EMERGENCY)
Age: 51
Discharge: HOME | End: 2021-02-03
Attending: EMERGENCY MEDICINE
Payer: MEDICAID

## 2021-02-03 VITALS
RESPIRATION RATE: 16 BRPM | HEART RATE: 110 BPM | WEIGHT: 165 LBS | SYSTOLIC BLOOD PRESSURE: 120 MMHG | DIASTOLIC BLOOD PRESSURE: 77 MMHG | TEMPERATURE: 97.5 F | OXYGEN SATURATION: 100 % | BODY MASS INDEX: 24.44 KG/M2 | HEIGHT: 69 IN

## 2021-02-03 DIAGNOSIS — F11.93 OPIATE WITHDRAWAL (HCC): Primary | ICD-10-CM

## 2021-02-03 DIAGNOSIS — R79.89 ELEVATED SERUM CREATININE: ICD-10-CM

## 2021-02-03 DIAGNOSIS — R11.2 NON-INTRACTABLE VOMITING WITH NAUSEA, UNSPECIFIED VOMITING TYPE: ICD-10-CM

## 2021-02-03 LAB
ALBUMIN SERPL-MCNC: 4.4 G/DL (ref 3.5–5)
ALBUMIN/GLOB SERPL: 1 {RATIO} (ref 1.1–2.2)
ALP SERPL-CCNC: 98 U/L (ref 45–117)
ALT SERPL-CCNC: 42 U/L (ref 12–78)
AMPHET UR QL SCN: NEGATIVE
ANION GAP SERPL CALC-SCNC: 14 MMOL/L (ref 5–15)
AST SERPL-CCNC: 62 U/L (ref 15–37)
BARBITURATES UR QL SCN: NEGATIVE
BASOPHILS # BLD: 0.1 K/UL (ref 0–0.1)
BASOPHILS NFR BLD: 1 % (ref 0–1)
BENZODIAZ UR QL: NEGATIVE
BILIRUB SERPL-MCNC: 2 MG/DL (ref 0.2–1)
BUN SERPL-MCNC: 44 MG/DL (ref 6–20)
BUN/CREAT SERPL: 32 (ref 12–20)
CALCIUM SERPL-MCNC: 9.8 MG/DL (ref 8.5–10.1)
CANNABINOIDS UR QL SCN: NEGATIVE
CHLORIDE SERPL-SCNC: 84 MMOL/L (ref 97–108)
CO2 SERPL-SCNC: 30 MMOL/L (ref 21–32)
COCAINE UR QL SCN: NEGATIVE
CREAT SERPL-MCNC: 1.38 MG/DL (ref 0.7–1.3)
DIFFERENTIAL METHOD BLD: ABNORMAL
DRUG SCRN COMMENT,DRGCM: ABNORMAL
EOSINOPHIL # BLD: 0.1 K/UL (ref 0–0.4)
EOSINOPHIL NFR BLD: 1 % (ref 0–7)
ERYTHROCYTE [DISTWIDTH] IN BLOOD BY AUTOMATED COUNT: 17.7 % (ref 11.5–14.5)
ETHANOL SERPL-MCNC: <10 MG/DL
GLOBULIN SER CALC-MCNC: 4.6 G/DL (ref 2–4)
GLUCOSE SERPL-MCNC: 114 MG/DL (ref 65–100)
HCT VFR BLD AUTO: 54.6 % (ref 36.6–50.3)
HGB BLD-MCNC: 18 G/DL (ref 12.1–17)
IMM GRANULOCYTES # BLD AUTO: 0 K/UL (ref 0–0.04)
IMM GRANULOCYTES NFR BLD AUTO: 0 % (ref 0–0.5)
LYMPHOCYTES # BLD: 1.7 K/UL (ref 0.8–3.5)
LYMPHOCYTES NFR BLD: 23 % (ref 12–49)
MCH RBC QN AUTO: 23 PG (ref 26–34)
MCHC RBC AUTO-ENTMCNC: 33 G/DL (ref 30–36.5)
MCV RBC AUTO: 69.7 FL (ref 80–99)
METHADONE UR QL: NEGATIVE
MONOCYTES # BLD: 1.1 K/UL (ref 0–1)
MONOCYTES NFR BLD: 14 % (ref 5–13)
NEUTS SEG # BLD: 4.5 K/UL (ref 1.8–8)
NEUTS SEG NFR BLD: 61 % (ref 32–75)
NRBC # BLD: 0 K/UL (ref 0–0.01)
NRBC BLD-RTO: 0 PER 100 WBC
OPIATES UR QL: POSITIVE
PCP UR QL: NEGATIVE
PLATELET # BLD AUTO: 231 K/UL (ref 150–400)
PMV BLD AUTO: 10.2 FL (ref 8.9–12.9)
POTASSIUM SERPL-SCNC: 3.1 MMOL/L (ref 3.5–5.1)
PROT SERPL-MCNC: 9 G/DL (ref 6.4–8.2)
RBC # BLD AUTO: 7.83 M/UL (ref 4.1–5.7)
RBC MORPH BLD: ABNORMAL
SODIUM SERPL-SCNC: 128 MMOL/L (ref 136–145)
WBC # BLD AUTO: 7.5 K/UL (ref 4.1–11.1)

## 2021-02-03 PROCEDURE — 90791 PSYCH DIAGNOSTIC EVALUATION: CPT

## 2021-02-03 PROCEDURE — 96361 HYDRATE IV INFUSION ADD-ON: CPT

## 2021-02-03 PROCEDURE — 36415 COLL VENOUS BLD VENIPUNCTURE: CPT

## 2021-02-03 PROCEDURE — 96375 TX/PRO/DX INJ NEW DRUG ADDON: CPT

## 2021-02-03 PROCEDURE — 85025 COMPLETE CBC W/AUTO DIFF WBC: CPT

## 2021-02-03 PROCEDURE — 74011250636 HC RX REV CODE- 250/636: Performed by: EMERGENCY MEDICINE

## 2021-02-03 PROCEDURE — 99284 EMERGENCY DEPT VISIT MOD MDM: CPT

## 2021-02-03 PROCEDURE — 74011000250 HC RX REV CODE- 250: Performed by: EMERGENCY MEDICINE

## 2021-02-03 PROCEDURE — 80053 COMPREHEN METABOLIC PANEL: CPT

## 2021-02-03 PROCEDURE — 80307 DRUG TEST PRSMV CHEM ANLYZR: CPT

## 2021-02-03 PROCEDURE — 82077 ASSAY SPEC XCP UR&BREATH IA: CPT

## 2021-02-03 PROCEDURE — 93005 ELECTROCARDIOGRAM TRACING: CPT

## 2021-02-03 PROCEDURE — 96374 THER/PROPH/DIAG INJ IV PUSH: CPT

## 2021-02-03 RX ORDER — DIPHENHYDRAMINE HYDROCHLORIDE 50 MG/ML
25 INJECTION, SOLUTION INTRAMUSCULAR; INTRAVENOUS
Status: COMPLETED | OUTPATIENT
Start: 2021-02-03 | End: 2021-02-03

## 2021-02-03 RX ORDER — ONDANSETRON 4 MG/1
4 TABLET, ORALLY DISINTEGRATING ORAL
Qty: 20 TAB | Refills: 0 | Status: SHIPPED | OUTPATIENT
Start: 2021-02-03

## 2021-02-03 RX ORDER — ONDANSETRON 2 MG/ML
4 INJECTION INTRAMUSCULAR; INTRAVENOUS
Status: COMPLETED | OUTPATIENT
Start: 2021-02-03 | End: 2021-02-03

## 2021-02-03 RX ORDER — CLONIDINE HYDROCHLORIDE 0.2 MG/1
0.2 TABLET ORAL 2 TIMES DAILY
Qty: 10 TAB | Refills: 0 | Status: SHIPPED | OUTPATIENT
Start: 2021-02-03 | End: 2021-02-08

## 2021-02-03 RX ADMIN — SODIUM CHLORIDE 1000 ML: 9 INJECTION, SOLUTION INTRAVENOUS at 15:36

## 2021-02-03 RX ADMIN — ONDANSETRON 4 MG: 2 INJECTION INTRAMUSCULAR; INTRAVENOUS at 12:55

## 2021-02-03 RX ADMIN — PROCHLORPERAZINE EDISYLATE 10 MG: 5 INJECTION INTRAMUSCULAR; INTRAVENOUS at 15:29

## 2021-02-03 RX ADMIN — SODIUM CHLORIDE 1000 ML: 9 INJECTION, SOLUTION INTRAVENOUS at 12:58

## 2021-02-03 RX ADMIN — DIPHENHYDRAMINE HYDROCHLORIDE 25 MG: 50 INJECTION, SOLUTION INTRAMUSCULAR; INTRAVENOUS at 15:27

## 2021-02-03 NOTE — DISCHARGE INSTRUCTIONS
Since you have no primary care provider listed we will refer you to [facility]. They are a part of the [employer]. They will have access to results of labs and imaging you had done here in the Emergency Room. Thank you for allowing us to provide you with medical care today. We realize that you have many choices for your emergency care needs. We thank you for choosing [facility].  Please choose us in the future for any continued health care needs. We hope we addressed all of your medical concerns. We strive to provide excellent quality care in the Emergency Department. Anything less than excellent does not meet our expectations. The exam and treatment you received in the Emergency Department were for an emergent problem and are not intended as complete care. It is important that you follow up with a doctor, nurse practitioner, or [address] [place] [address] assistant for ongoing care. If your symptoms worsen or you do not improve as expected and you are unable to reach your usual health care provider, you should return to the Emergency Department. We are available 24 hours a day. Take this sheet with you when you go to your follow-up visit. If you have any problem arranging the follow-up visit, contact the Emergency Department immediately. Make an appointment your family doctor for follow up of this visit. Return to the ER if you are unable to be seen in a timely manner.

## 2021-02-03 NOTE — ED PROVIDER NOTES
Patient is a 44-year-old otherwise healthy male presenting to emergency department for evaluation of heroin withdrawal.  Patient states that for the past 7 months he has been snorting heroin, denies any IV drug use, and decided to quit 5 days ago. He has since been experiencing weakness, chills, nausea, vomiting, and diarrhea. Denies any blood in his stool or vomit. He denies shaking, confusion, seizure activity, chest pain, fever, or any other medical complaints at this time. He states he does not have any outpatient resources to aid in his withdrawal.  He states he has occasional alcohol use, denies any recently, denies any additional drug use other than the heroin. Never gone through withdrawal before. Please note that this dictation was completed with [employer], the computer voice recognition software.  Quite often unanticipated grammatical, syntax, homophones, and other interpretive errors are inadvertently transcribed by the computer software.  Please disregard these errors.  Please excuse any errors that have escaped final proofreading. Past Medical History:   Diagnosis Date    Back pain        Past Surgical History:   Procedure Laterality Date    HX OTHER SURGICAL      Nasal bridge fracture         Family History:   Problem Relation Age of Onset    Diabetes Mother        Social History     Socioeconomic History    Marital status: SINGLE     Spouse name: Not on file    Number of children: Not on file    Years of education: Not on file    Highest education level: Not on file   Occupational History    Not on file   Social Needs    Financial resource strain: Not on file    Food insecurity     Worry: Not on file     Inability: Not on file    Transportation needs     Medical: Not on file     Non-medical: Not on file   Tobacco Use    Smoking status: Current Every Day Smoker     Packs/day: 0.50    Smokeless tobacco: Never Used   Substance and Sexual Activity    Alcohol use:  Yes Alcohol/week: 0.0 standard drinks     Comment: Rarely    Drug use: No    Sexual activity: Never   Lifestyle    Physical activity     Days per week: Not on file     Minutes per session: Not on file    Stress: Not on file   Relationships    Social connections     Talks on phone: Not on file     Gets together: Not on file     Attends Holiness service: Not on file     Active member of club or organization: Not on file     Attends meetings of clubs or organizations: Not on file     Relationship status: Not on file    Intimate partner violence     Fear of current or ex partner: Not on file     Emotionally abused: Not on file     Physically abused: Not on file     Forced sexual activity: Not on file   Other Topics Concern    Not on file   Social History Narrative    Not on file         ALLERGIES: Tramadol and Tylenol [acetaminophen]    Review of Systems   Constitutional: Positive for chills and fatigue. Negative for fever. HENT: Negative for ear pain and sore throat. Eyes: Negative for visual disturbance. Respiratory: Negative for cough and shortness of breath. Cardiovascular: Negative for chest pain. Gastrointestinal: Positive for diarrhea, nausea and vomiting. Negative for abdominal pain and blood in stool. Genitourinary: Negative for flank pain. Musculoskeletal: Negative for back pain. Skin: Negative for color change. Neurological: Negative for dizziness and headaches. Psychiatric/Behavioral: Negative for confusion. Vitals:    02/03/21 1111   BP: 106/74   Pulse: 81   Resp: 16   Temp: 97.5 °F (36.4 °C)   SpO2: 98%   Weight: 74.8 kg (165 lb)   Height: 5' 9\" (1.753 m)            Physical Exam  Vitals signs and nursing note reviewed. Constitutional:       General: He is not in acute distress. Appearance: Normal appearance. He is not ill-appearing. HENT:      Head: Normocephalic and atraumatic. Mouth/Throat:      Pharynx: Oropharynx is clear.    Eyes:      Extraocular Movements: Extraocular movements intact. Conjunctiva/sclera: Conjunctivae normal.   Neck:      Musculoskeletal: No neck rigidity. Cardiovascular:      Rate and Rhythm: Normal rate and regular rhythm. Pulmonary:      Effort: Pulmonary effort is normal.      Breath sounds: Normal breath sounds. Abdominal:      Palpations: Abdomen is soft. Tenderness: There is no abdominal tenderness. Musculoskeletal: Normal range of motion. Skin:     General: Skin is warm and dry. Neurological:      General: No focal deficit present. Mental Status: He is alert and oriented to person, place, and time. Psychiatric:         Mood and Affect: Mood normal.          MDM  Number of Diagnoses or Management Options  Elevated serum creatinine  Non-intractable vomiting with nausea, unspecified vomiting type  Opiate withdrawal ([address]  Diagnosis management comments: Patient is alert, afebrile, vitals stable. Patient is ambulatory in ED without signs of acute distress. Here for withdrawal from snorted heroin, on day 5 of withdrawal.  Complaints are fatigue, nausea, nonbloody vomiting, nonbloody diarrhea. Lungs are clear. Heart sounds normal.  Abdomen is soft, nondistended, nontender. Plan to obtain EKG, lab work, and urine drug screen, will give IV fluids and antiemetics for symptoms, and p.o. challenge. We will also have [facility] mental health team evaluate patient and provide resources. Discussed patient with ED attending Rhonda Salazar MD who agrees with current management plan and ommends RX for clonidine for withdrawal symptoms relief.    2:06 PM  Lab work shows hyponatremia, mild hypokalemia, increased creatinine over baseline with elevated BUN likely consistent with dehydration, and elevated total bilirubin. Pt reevaluated, failed po challenge, will give additional fluid bolus and antiemetics. 5:30 PM  Patient received additional IV fluid bolus, Compazine and Benadryl.   He is now tolerating p.o. without additional nausea or vomiting. States he is feeling much better and would like to be discharged to emergency department. Patient to be discharged with oral Zofran and clonidine for withdrawal symptoms. He has been given outpatient resources from [facility] to assist in his withdrawal. Referral given for primary care for repeat lab work to monitor kidney function and further management. Strict return precautions outlined. All questions answered at this time. Amount and/or Complexity of Data Reviewed  Clinical lab tests: reviewed  Tests in the medicine section of CPT®: reviewed  Decide to obtain previous medical records or to obtain history from someone other than the patient: yes  Discuss the patient with other providers: yes (ED attending Dr. Tiffany Salinas)      ED Course as of Feb 03 1406   Wed Feb 03, 2021   1251 ED EKG interpretation:12:51 PM  Rhythm: sinus tachycardia; and regular. Rate (approx.): 111; Axis: normal; P wave: normal; ST/T wave: no concerning ST elevations or depressions; Other findings: Qtc 484, unremarkable. EKG has also been evaluated by attending ED physician. ALEAH Das        [EP]   0063 METABOLIC PANEL, COMPREHENSIVE(!):    Sodium 128(!)   Potassium 3.1(!)   Chloride 84(!)   CO2 30   Anion gap 14   Glucose 114(!)   BUN 44(!)   Creatinine 1.38(!)   BUN/Creatinine ratio 32(!)   GFR est AA >60   GFR est non-AA 55(!)   Calcium 9.8   Bilirubin, total 2.0(!)   ALT 42   AST 62(!)   Alk. phosphatase 98   Protein, total 9.0(!)   Albumin 4.4   Globulin 4.6(!)   A-G Ratio 1.0(!) [EP]   1406 CBC WITH AUTOMATED DIFF(!):    WBC 7.5   RBC 7.83(!)   HGB 18.0(!)   HCT 54.6(!)   MCV 69.7(!)   MCH 23.0(!)   MCHC 33.0   RDW 17.7(!)   PLATELET 571   MPV 67.3   NRBC 0.0   ABSOLUTE NRBC 0.00   NEUTROPHILS 61   LYMPHOCYTES 23   MONOCYTES 14(!)   EOSINOPHILS 1   BASOPHILS 1   IMMATURE GRANULOCYTES 0   ABS. NEUTROPHILS 4.5   ABS. LYMPHOCYTES 1.7   ABS. MONOCYTES 1.1(!)   ABS. EOSINOPHILS 0.1   ABS. BASOPHILS 0.1   ABS. IMM. GRANS. 0.0   DF SMEAR SCANNED   RBC COMMENTS ANISOCYTOSIS  1+   [EP]   1406 ALCOHOL(ETHYL),SERUM: <10 [EP]      ED Course User Index  [EP] El Preston PA     5:36 PM  Pt has been reevaluated. There are no new complaints, changes, or physical findings at this time. Medications have been reviewed w/ pt and/or family. Pt and/or family's questions have been answered. Pt and/or family expressed good understanding of the dx/tx/rx and is in agreement with plan of care. Pt instructed and agreed to f/u w/ PCP and to return to ED upon further deterioration. Pt is ready for discharge. IMPRESSION:  1. Opiate withdrawal ([address]    2. Non-intractable vomiting with nausea, unspecified vomiting type    3. Elevated serum creatinine        PLAN:  1. Current Discharge Medication List      START taking these medications    Details   ondansetron (Zofran ODT) 4 mg disintegrating tablet 1 Tab by SubLINGual route every eight (8) hours as needed for Nausea or Vomiting. Qty: 20 Tab, Refills: 0      cloNIDine HCL (CATAPRES) 0.2 mg tablet Take 1 Tab by mouth two (2) times a day for 5 days. Qty: 10 Tab, Refills: 0           2.    Follow-up Information     Follow up With Specialties Details [address]  Schedule an appointment as soon as possible for a visit   [address] [phone]            Return to ED if worse     Procedures

## 2021-02-03 NOTE — BSMART NOTE
Patient came in for heroin withdrawal.  He is not requesting admission and is hoping to get his withdrawal symptoms stabilized in the ER. Patient reported he has been snorting heroin for 7 months and is trying to get off of it. Patient reported none since Friday/Saturday morning. Patient indicated his mother brought him here because he feels \"really bad. \"  Patient reporting N/V/D and body aches. Patient's mood is irritable and anxious. In past few days he has not been eating or sleeping. Patient denied suicidal or homicidal ideation. No evidence of any psychosis. No other substances reported. Patient believes he can quit on his own but will provide our general  Resource hand out for patient to take with him should he want or need support.

## 2021-02-04 LAB
ATRIAL RATE: 111 BPM
CALCULATED P AXIS, ECG09: 86 DEGREES
CALCULATED R AXIS, ECG10: 92 DEGREES
CALCULATED T AXIS, ECG11: 64 DEGREES
DIAGNOSIS, 93000: NORMAL
P-R INTERVAL, ECG05: 126 MS
Q-T INTERVAL, ECG07: 356 MS
QRS DURATION, ECG06: 84 MS
QTC CALCULATION (BEZET), ECG08: 484 MS
VENTRICULAR RATE, ECG03: 111 BPM

## 2021-02-15 ENCOUNTER — APPOINTMENT (OUTPATIENT)
Dept: GENERAL RADIOLOGY | Age: 51
End: 2021-02-15
Attending: NURSE PRACTITIONER
Payer: MEDICAID

## 2021-02-15 ENCOUNTER — HOSPITAL ENCOUNTER (EMERGENCY)
Age: 51
Discharge: HOME | End: 2021-02-15
Attending: EMERGENCY MEDICINE
Payer: MEDICAID

## 2021-02-15 VITALS
TEMPERATURE: 97.8 F | RESPIRATION RATE: 16 BRPM | HEART RATE: 66 BPM | SYSTOLIC BLOOD PRESSURE: 148 MMHG | DIASTOLIC BLOOD PRESSURE: 97 MMHG | OXYGEN SATURATION: 99 %

## 2021-02-15 DIAGNOSIS — Z20.822 SUSPECTED COVID-19 VIRUS INFECTION: Primary | ICD-10-CM

## 2021-02-15 LAB
ATRIAL RATE: 69 BPM
CALCULATED P AXIS, ECG09: 61 DEGREES
CALCULATED R AXIS, ECG10: 76 DEGREES
CALCULATED T AXIS, ECG11: 56 DEGREES
DIAGNOSIS, 93000: NORMAL
P-R INTERVAL, ECG05: 122 MS
Q-T INTERVAL, ECG07: 372 MS
QRS DURATION, ECG06: 80 MS
QTC CALCULATION (BEZET), ECG08: 398 MS
SARS-COV-2, COV2: NORMAL
SARS-COV-2, XPLCVT: NOT DETECTED
SOURCE, COVRS: NORMAL
VENTRICULAR RATE, ECG03: 69 BPM

## 2021-02-15 PROCEDURE — 99283 EMERGENCY DEPT VISIT LOW MDM: CPT

## 2021-02-15 PROCEDURE — 93005 ELECTROCARDIOGRAM TRACING: CPT

## 2021-02-15 PROCEDURE — U0005 INFEC AGEN DETEC AMPLI PROBE: HCPCS

## 2021-02-15 PROCEDURE — 71045 X-RAY EXAM CHEST 1 VIEW: CPT

## 2021-02-15 RX ORDER — BENZONATATE 100 MG/1
100 CAPSULE ORAL
Qty: 20 CAP | Refills: 0 | Status: SHIPPED | OUTPATIENT
Start: 2021-02-15 | End: 2021-02-22

## 2021-02-15 RX ORDER — UREA 10 %
220 LOTION (ML) TOPICAL DAILY
Qty: 7 TAB | Refills: 0 | Status: SHIPPED | OUTPATIENT
Start: 2021-02-15

## 2021-02-15 RX ORDER — ASCORBIC ACID 500 MG
500 TABLET ORAL 2 TIMES DAILY
Qty: 14 TAB | Refills: 0 | Status: SHIPPED | OUTPATIENT
Start: 2021-02-15 | End: 2021-02-22

## 2021-02-15 NOTE — ED PROVIDER NOTES
This is a 29-year-old male with past medical history of back pain and prior substance abuse who presents the ER today for evaluation of COVID-19-like symptoms for approximately 2 or 3 days in duration. According the patient, he lives with his mother, who recently tested positive for COVID-19. Starting several days ago he began to develop symptoms including chills, generalized abdominal discomfort, decreased appetite, loose stool, nausea, anosmia/ageusia, headache, and body aches. He also endorses a mild dry cough and some shortness of breath with normal activities of daily living. He denies any recent illicit drug use (was seen on 2/3/21 for heroin withdrawal - intranasally not intravenously). No urinary complaints, palpitations, syncope, chest pain. Past Medical History:   Diagnosis Date    Back pain        Past Surgical History:   Procedure Laterality Date    HX OTHER SURGICAL      Nasal bridge fracture         Family History:   Problem Relation Age of Onset    Diabetes Mother        Social History     Socioeconomic History    Marital status: SINGLE     Spouse name: Not on file    Number of children: Not on file    Years of education: Not on file    Highest education level: Not on file   Occupational History    Not on file   Social Needs    Financial resource strain: Not on file    Food insecurity     Worry: Not on file     Inability: Not on file    Transportation needs     Medical: Not on file     Non-medical: Not on file   Tobacco Use    Smoking status: Current Every Day Smoker     Packs/day: 0.50    Smokeless tobacco: Never Used   Substance and Sexual Activity    Alcohol use:  Yes     Alcohol/week: 0.0 standard drinks     Comment: Rarely    Drug use: No    Sexual activity: Never   Lifestyle    Physical activity     Days per week: Not on file     Minutes per session: Not on file    Stress: Not on file   Relationships    Social connections     Talks on phone: Not on file     Gets together: Not on file     Attends [employer] service: Not on file     Active member of club or organization: Not on file     Attends meetings of clubs or organizations: Not on file     Relationship status: Not on file    Intimate partner violence     Fear of current or ex partner: Not on file     Emotionally abused: Not on file     Physically abused: Not on file     Forced sexual activity: Not on file   Other Topics Concern    Not on file   Social History Narrative    Not on file         ALLERGIES: Tramadol and Tylenol [acetaminophen]    Review of Systems   Constitutional: Positive for activity change, appetite change, chills and fatigue. Negative for fever. HENT: Negative for sore throat. Eyes: Negative for visual disturbance. Respiratory: Positive for cough and shortness of breath. Cardiovascular: Negative for chest pain, palpitations and leg swelling. Gastrointestinal: Positive for diarrhea and nausea. Negative for vomiting. Genitourinary: Negative for dysuria and flank pain. Musculoskeletal: Positive for myalgias. Skin: Negative for rash. Neurological: Positive for headaches. Negative for dizziness, syncope and weakness. Psychiatric/Behavioral: Negative for dysphoric mood. Vitals:    02/15/21 0824   BP: (!) 148/97   Pulse: 66   Resp: 16   Temp: 97.8 °F (36.6 °C)   SpO2: 99%            Physical Exam  Vitals signs and nursing note reviewed. Constitutional:       General: He is not in acute distress. Appearance: Normal appearance. He is not ill-appearing. Comments: Appears anxious and uncomfortable   HENT:      Head: Normocephalic and atraumatic. Nose: Nose normal.      Mouth/Throat:      Mouth: Mucous membranes are moist.      Pharynx: Oropharynx is clear. Eyes:      Extraocular Movements: Extraocular movements intact. Neck:      Musculoskeletal: Normal range of motion and neck supple. No neck rigidity or muscular tenderness.    Cardiovascular:      Rate and Rhythm: Normal rate and regular rhythm. Pulses: Normal pulses. Heart sounds: Normal heart sounds. Pulmonary:      Effort: Pulmonary effort is normal.      Breath sounds: Normal breath sounds. Comments: Faint bibasilar crackles that clear with coughing  Abdominal:      General: Bowel sounds are normal.      Palpations: Abdomen is soft. Musculoskeletal: Normal range of motion. Skin:     General: Skin is warm and dry. Neurological:      Mental Status: He is alert and oriented to person, place, and time. Mental status is at baseline. Psychiatric:         Mood and Affect: Mood normal.         Behavior: Behavior normal.          MDM     VITAL SIGNS:  Patient Vitals for the past 4 hrs:   Temp Pulse Resp BP SpO2   02/15/21 0824 97.8 °F (36.6 °C) 66 16 (!) 148/97 99 %         LABS:  Recent Results (from the past 6 hour(s))   EKG, 12 LEAD, INITIAL    Collection Time: 02/15/21  8:30 AM   Result Value Ref Range    Ventricular Rate 69 BPM    Atrial Rate 69 BPM    P-R Interval 122 ms    QRS Duration 80 ms    Q-T Interval 372 ms    QTC Calculation (Bezet) 398 ms    Calculated P Axis 61 degrees    Calculated R Axis 76 degrees    Calculated T Axis 56 degrees    Diagnosis       ** Poor data quality, interpretation may be adversely affected  Normal sinus rhythm  When compared with ECG of 03-FEB-2021 12:14,  Vent. rate has decreased BY  42 BPM  Non-specific change in ST segment in Inferior leads          IMAGING:  XR CHEST PORT   Final Result      No acute process. No evidence of pneumonia. Medications During Visit:  Medications - No data to display      DECISION MAKING:  Shital Paiz is a 54 y.o. male who comes in as above. Negative chest x-ray, pending COVID-19 test.  Tessalon, vitamin C, zinc and return precautions. The clinical decision making for this encounter included ordering and interpreting the above diagnostic tests with comparison to prior studies that are within our EMR.     Past medical and surgical histories were reviewed, as were records from recent outpatient and emergency department visits. The above results discussed and reviewed with the patient. Patient verbalized understanding of the care plan, including any changes to current outpatient medication regimen, discussed disease process, symptom control, and follow-up care. Return precautions reviewed. IMPRESSION:  1. Suspected COVID-19 virus infection        DISPOSITION:  Discharged      Current Discharge Medication List      START taking these medications    Details   ascorbic acid, vitamin C, (Vitamin C) 500 mg tablet Take 1 Tab by mouth two (2) times a day for 7 days. Qty: 14 Tab, Refills: 0      zinc sulfate 220 mg tablet Take 1 Tab by mouth daily. Qty: 7 Tab, Refills: 0      benzonatate (Tessalon Perles) 100 mg capsule Take 1 Cap by mouth three (3) times daily as needed for Cough for up to 7 days. Qty: 20 Cap, Refills: 0              Follow-up Information     Follow up With Specialties Details Why Contact Info    [address] [facility]  If symptoms worsen [address] [place] [address]  [phone]            The patient is asked to follow-up with their primary care provider in the next several days. They are to call tomorrow for an appointment. The patient is asked to return promptly for any increased concerns or worsening of symptoms. They can return to this emergency department or any other emergency department.

## 2021-02-15 NOTE — ED TRIAGE NOTES
Pt stated he was exposed to Covid and is having symptoms, +subjective fever and chills, +diarrhea , +body aches, +cough

## 2023-01-01 NOTE — ED TRIAGE NOTES
Patient using heroin for 7 months (snorting) and decided to stop Saturday. Reports nausea and body aches. Denies any previous attempts at detox. Denies and sz like activity. Denies SI/Hi.
Statement Selected

## 2023-01-30 ENCOUNTER — HOSPITAL ENCOUNTER (EMERGENCY)
Age: 53
Discharge: HOME | End: 2023-01-30
Attending: STUDENT IN AN ORGANIZED HEALTH CARE EDUCATION/TRAINING PROGRAM
Payer: MEDICAID

## 2023-01-30 VITALS
HEART RATE: 97 BPM | TEMPERATURE: 97.5 F | RESPIRATION RATE: 16 BRPM | SYSTOLIC BLOOD PRESSURE: 128 MMHG | OXYGEN SATURATION: 98 % | DIASTOLIC BLOOD PRESSURE: 91 MMHG

## 2023-01-30 DIAGNOSIS — J01.01 ACUTE RECURRENT MAXILLARY SINUSITIS: Primary | ICD-10-CM

## 2023-01-30 DIAGNOSIS — J34.89 SINUS PAIN: ICD-10-CM

## 2023-01-30 PROCEDURE — 99283 EMERGENCY DEPT VISIT LOW MDM: CPT

## 2023-01-30 RX ORDER — KETOROLAC TROMETHAMINE 10 MG/1
10 TABLET, FILM COATED ORAL
Qty: 12 TABLET | Refills: 0 | Status: SHIPPED | OUTPATIENT
Start: 2023-01-30

## 2023-01-30 RX ORDER — PREDNISONE 20 MG/1
40 TABLET ORAL DAILY
Qty: 10 TABLET | Refills: 0 | Status: SHIPPED | OUTPATIENT
Start: 2023-01-30 | End: 2023-02-04

## 2023-01-30 RX ORDER — AMOXICILLIN AND CLAVULANATE POTASSIUM 875; 125 MG/1; MG/1
1 TABLET, FILM COATED ORAL 2 TIMES DAILY
Qty: 20 TABLET | Refills: 0 | Status: SHIPPED | OUTPATIENT
Start: 2023-01-30

## 2023-01-30 RX ORDER — HYDROCODONE BITARTRATE AND ACETAMINOPHEN 7.5; 325 MG/1; MG/1
1 TABLET ORAL
Qty: 6 TABLET | Refills: 0 | Status: SHIPPED | OUTPATIENT
Start: 2023-01-30 | End: 2023-02-02

## 2023-01-30 NOTE — ED PROVIDER NOTES
Cold  Associated symptoms include headaches and sore throat. Pertinent negatives include no chest pain, no ear pain, no shortness of breath, no nausea and no vomiting. Patient is a 61-year-old male with past medical history significant for sinusitis and back pain who presents to the ED with right-sided facial tenderness, thick yellow nasal congestion and sore throat for 2 to 3 days. He states that he has had sinus infections in the past and it feels the same. He states that he has had a headache for the past 24 hours comparable to when he has had a sinus infection in the past.Denies fever, neck pain, visual changes, focal weakness or rash. Denies any difficulty breathing, difficulty swallowing, SOB or chest pain. Denies any nausea, vomiting or diarrhea. Pt. Reports that he has taken over-the-counter cold remedies without relief. Old charts reviewed. Past Medical History:   Diagnosis Date    Back pain        Past Surgical History:   Procedure Laterality Date    HX OTHER SURGICAL      Nasal bridge fracture         Family History:   Problem Relation Age of Onset    Diabetes Mother        Social History     Socioeconomic History    Marital status: SINGLE     Spouse name: Not on file    Number of children: Not on file    Years of education: Not on file    Highest education level: Not on file   Occupational History    Not on file   Tobacco Use    Smoking status: Every Day     Packs/day: 0.50     Types: Cigarettes    Smokeless tobacco: Never   Substance and Sexual Activity    Alcohol use:  Yes     Alcohol/week: 0.0 standard drinks     Comment: Rarely    Drug use: No    Sexual activity: Never   Other Topics Concern    Not on file   Social History Narrative    Not on file     Social Determinants of Health     Financial Resource Strain: Not on file   Food Insecurity: Not on file   Transportation Needs: Not on file   Physical Activity: Not on file   Stress: Not on file   Social Connections: Not on file   Intimate Partner Violence: Not on file   Housing Stability: Not on file         ALLERGIES: Tramadol and Tylenol [acetaminophen]    Review of Systems   Constitutional:  Negative for activity change, appetite change, fever and unexpected weight change. HENT:  Positive for congestion, sinus pressure, sinus pain and sore throat. Negative for ear pain, facial swelling, mouth sores and trouble swallowing. Eyes:  Negative for visual disturbance. Respiratory:  Positive for cough. Negative for shortness of breath. Cardiovascular:  Negative for chest pain, palpitations and leg swelling. Gastrointestinal:  Negative for abdominal pain, diarrhea, nausea and vomiting. Genitourinary:  Negative for dysuria and flank pain. Musculoskeletal:  Negative for back pain and neck pain. Skin:  Negative for rash. Neurological:  Positive for headaches. Negative for dizziness and light-headedness. All other systems reviewed and are negative. Vitals:    01/30/23 1322   BP: (!) 128/91   Pulse: 97   Resp: 16   Temp: 97.5 °F (36.4 °C)   SpO2: 98%            Physical Exam  Vitals and nursing note reviewed. Constitutional:       General: He is not in acute distress. Appearance: Normal appearance. He is normal weight. He is not ill-appearing, toxic-appearing or diaphoretic. Comments: Black male; former smoker   HENT:      Head: Normocephalic. Right Ear: Tympanic membrane normal.      Left Ear: Tympanic membrane normal.      Nose: Congestion present. Comments: Reports maxillary area tenderness with palpation; reports thick yellow nasal discharge and productive cough     Mouth/Throat:      Mouth: Mucous membranes are moist.      Pharynx: No posterior oropharyngeal erythema. Cardiovascular:      Rate and Rhythm: Normal rate and regular rhythm. Pulmonary:      Effort: Pulmonary effort is normal.      Breath sounds: Normal breath sounds. Musculoskeletal:         General: Normal range of motion.       Cervical back: Normal range of motion and neck supple. No tenderness. Right lower leg: No edema. Left lower leg: No edema. Lymphadenopathy:      Cervical: No cervical adenopathy. Skin:     General: Skin is warm and dry. Findings: No bruising, erythema or rash. Neurological:      Mental Status: He is alert and oriented to person, place, and time. Medical Decision Making  Risk  Prescription drug management. Procedures    Patient is requesting an antibiotic, prednisone and something for his headache. He does not want blood work or x-rays at this time. He was given referrals to ENT for further evaluation and treatment. Plan to treat with short course of prednisone, Augmentin and a few pain pills for headache. Encouraged increased fluids, rest and close follow-up.    2:02 PM  Patient's results and plan of care have been reviewed with him. Patient has verbally conveyed his understanding and agreement of his signs, symptoms, diagnosis, treatment and prognosis and additionally agrees to follow up as recommended or return to the Emergency Room should his condition change prior to follow-up. Discharge instructions have also been provided to the patient with some educational information regarding his diagnosis as well a list of reasons why he would want to return to the ER prior to his follow-up appointment should his condition change. Scotty Zavala NP

## 2023-04-21 ENCOUNTER — HOSPITAL ENCOUNTER (EMERGENCY)
Age: 53
Discharge: HOME | End: 2023-04-21
Attending: EMERGENCY MEDICINE
Payer: MEDICAID

## 2023-04-21 VITALS
HEIGHT: 69 IN | DIASTOLIC BLOOD PRESSURE: 89 MMHG | OXYGEN SATURATION: 97 % | BODY MASS INDEX: 27.7 KG/M2 | RESPIRATION RATE: 17 BRPM | SYSTOLIC BLOOD PRESSURE: 135 MMHG | TEMPERATURE: 98 F | WEIGHT: 187 LBS | HEART RATE: 95 BPM

## 2023-04-21 DIAGNOSIS — J01.00 ACUTE NON-RECURRENT MAXILLARY SINUSITIS: Primary | ICD-10-CM

## 2023-04-21 PROCEDURE — 99283 EMERGENCY DEPT VISIT LOW MDM: CPT

## 2023-04-21 RX ORDER — NAPROXEN 500 MG/1
500 TABLET ORAL
Qty: 20 TABLET | Refills: 0 | Status: SHIPPED | OUTPATIENT
Start: 2023-04-21

## 2023-04-21 RX ORDER — LORATADINE 10 MG/1
10 TABLET ORAL
COMMUNITY

## 2023-04-21 RX ORDER — NAPROXEN 500 MG/1
500 TABLET ORAL
Qty: 20 TABLET | Refills: 0 | Status: SHIPPED | OUTPATIENT
Start: 2023-04-21 | End: 2023-04-21 | Stop reason: SDUPTHER

## 2023-04-21 RX ORDER — AMOXICILLIN AND CLAVULANATE POTASSIUM 875; 125 MG/1; MG/1
1 TABLET, FILM COATED ORAL 2 TIMES DAILY
Qty: 20 TABLET | Refills: 0 | Status: SHIPPED | OUTPATIENT
Start: 2023-04-21 | End: 2023-05-01

## 2023-04-21 RX ORDER — AMOXICILLIN AND CLAVULANATE POTASSIUM 875; 125 MG/1; MG/1
1 TABLET, FILM COATED ORAL 2 TIMES DAILY
Qty: 20 TABLET | Refills: 0 | Status: SHIPPED | OUTPATIENT
Start: 2023-04-21 | End: 2023-04-21 | Stop reason: SDUPTHER

## 2023-04-22 NOTE — ED PROVIDER NOTES
HPI   Patient is a 48 y.o. M who presents today with complaints of nasal congestion, sore throat, nasal pressure. Reports thick, yellow nasal congestion. History of sinus infection. History of nasal bridge fracture with repair. Taking Claritin and Zyrtec, flonase. Has had symptoms for the last 4 days. No fever. ALLERGIES: Tramadol and Tylenol [acetaminophen]    Past Medical History:   Diagnosis Date    Back pain      Past Surgical History:   Procedure Laterality Date    HX OTHER SURGICAL      Nasal bridge fracture       Review of Systems   Constitutional:  Negative for fatigue and fever. HENT:  Positive for sinus pressure, sinus pain and sore throat. Negative for congestion. Respiratory:  Negative for cough, shortness of breath and wheezing. Cardiovascular:  Negative for chest pain. Gastrointestinal:  Negative for abdominal pain, constipation, diarrhea, nausea and vomiting. Genitourinary:  Negative for flank pain. Musculoskeletal:  Negative for arthralgias and myalgias. Skin:  Negative for wound. Neurological:  Negative for dizziness, seizures, light-headedness and numbness. Psychiatric/Behavioral:  Negative for confusion. Vitals:    04/21/23 2019   BP: 135/89   Pulse: 95   Resp: 17   Temp: 98 °F (36.7 °C)   SpO2: 97%   Weight: 84.8 kg (187 lb)   Height: 5' 9\" (1.753 m)            Physical Exam  Vitals and nursing note reviewed. Constitutional:       General: He is not in acute distress. Appearance: Normal appearance. He is normal weight. He is not ill-appearing or toxic-appearing. HENT:      Head: Normocephalic and atraumatic. Nose: Mucosal edema and congestion present. No rhinorrhea. Right Sinus: Maxillary sinus tenderness and frontal sinus tenderness present. Left Sinus: Maxillary sinus tenderness and frontal sinus tenderness present. Mouth/Throat:      Mouth: Mucous membranes are moist.      Pharynx: Oropharynx is clear. Uvula midline.  No pharyngeal swelling, oropharyngeal exudate or posterior oropharyngeal erythema. Eyes:      Conjunctiva/sclera: Conjunctivae normal.   Cardiovascular:      Rate and Rhythm: Normal rate and regular rhythm. Heart sounds: No murmur heard. Pulmonary:      Effort: Pulmonary effort is normal. No respiratory distress. Breath sounds: Normal breath sounds. No stridor. No wheezing, rhonchi or rales. Chest:      Chest wall: No tenderness. Abdominal:      Palpations: Abdomen is soft. Tenderness: There is no abdominal tenderness. Musculoskeletal:         General: Normal range of motion. Cervical back: Normal range of motion. Skin:     General: Skin is warm and dry. Neurological:      Mental Status: He is alert and oriented to person, place, and time. Motor: No weakness. Psychiatric:         Mood and Affect: Mood normal.            LABORATORY RESULTS:  No results found for this or any previous visit (from the past 24 hour(s)). IMAGING RESULTS:  No results found. MEDICATIONS GIVEN:  Medications - No data to display         Medical Decision Making  Risk  Prescription drug management. Patient with history of recurrent sinusitis, nasal bone fracture is seen today for nasal congestion, thick, yellow, sore throat. On exam, I do note marked nasal mucosal edema, yellow nasal congestion. Normal oropharynx. Patient has only had symptoms for 3 days, recommended waiting for ABX as most likely allergic rhinitis. Patient states this has happened in the past and he has received ABX. Explained to patient ABX would likely provide no benefit, however patient has tried Sudafed, Afrin, allergy medication, flonase without relief. Given patient's severe symptoms, sinus tenderness to palpation, and no improvement of symptoms with supportive medications, will treat with Augmentin.        Discussed results and work-up with patient and answered all questions, the patient expresses understanding and agrees with the care plan and disposition. The patient was given an opportunity to ask questions and all concerns raised were addressed prior to discharge. Recommended patient follow-up with provider as listed below. Counseled patient on standard home and self-care measures. Specifically explained the emergent conditions that could arise and clearly instructed the patient to return to the emergency department for those and any other new, worsening, or concerning symptoms. Patient stable and ready for discharge. IMPRESSION:  1. Acute non-recurrent maxillary sinusitis        DISPOSITION:  Discharge    PLAN:  Follow-up Information       Follow up With Specialties Details Why Contact Info    [address] [place] [address] DEPT OF OTOLARYNGOLOGY  Schedule an appointment as soon as possible for a visit   [address] [place] [address]  [phone]          Current Discharge Medication List        START taking these medications    Details   naproxen (NAPROSYN) 500 mg tablet Take 1 Tablet by mouth two (2) times daily as needed for Pain. Qty: 20 Tablet, Refills: 0  Start date: 4/21/2023           CONTINUE these medications which have CHANGED    Details   amoxicillin-clavulanate (Augmentin) 875-125 mg per tablet Take 1 Tablet by mouth two (2) times a day for 10 days. Qty: 20 Tablet, Refills: 0  Start date: 4/21/2023, End date: 5/1/2023                                          Please note that this dictation was completed with miLibr[employer], the computer voice recognition software. Quite often unanticipated grammatical, syntax, homophones, and other interpretive errors are inadvertently transcribed by the computer software. Please disregard these errors. Please excuse any errors that have escaped final proofreading.

## 2023-04-22 NOTE — ED TRIAGE NOTES
Pt reports yellow nasal drainage x 3 days w/ sneezing.  Pt states he has taken Claritin earlier w/o relief

## 2023-08-22 ENCOUNTER — HOSPITAL ENCOUNTER (EMERGENCY)
Facility: HOSPITAL | Age: 53
Discharge: HOME | End: 2023-08-22
Attending: STUDENT IN AN ORGANIZED HEALTH CARE EDUCATION/TRAINING PROGRAM
Payer: MEDICAID

## 2023-08-22 VITALS
RESPIRATION RATE: 16 BRPM | HEART RATE: 104 BPM | TEMPERATURE: 98.2 F | OXYGEN SATURATION: 97 % | BODY MASS INDEX: 26.16 KG/M2 | HEIGHT: 69 IN | WEIGHT: 176.59 LBS | DIASTOLIC BLOOD PRESSURE: 71 MMHG | SYSTOLIC BLOOD PRESSURE: 121 MMHG

## 2023-08-22 DIAGNOSIS — T63.441A BEE STING, ACCIDENTAL OR UNINTENTIONAL, INITIAL ENCOUNTER: Primary | ICD-10-CM

## 2023-08-22 DIAGNOSIS — M79.89 LEG SWELLING: ICD-10-CM

## 2023-08-22 PROCEDURE — 99283 EMERGENCY DEPT VISIT LOW MDM: CPT

## 2023-08-22 RX ORDER — NAPROXEN 500 MG/1
500 TABLET ORAL 2 TIMES DAILY
Qty: 10 TABLET | Refills: 0 | Status: SHIPPED | OUTPATIENT
Start: 2023-08-22 | End: 2023-08-27

## 2023-08-22 RX ORDER — PREDNISONE 5 MG/1
TABLET ORAL
Qty: 21 EACH | Refills: 0 | Status: SHIPPED | OUTPATIENT
Start: 2023-08-22

## 2023-08-22 ASSESSMENT — ENCOUNTER SYMPTOMS
SHORTNESS OF BREATH: 0
COUGH: 0
TROUBLE SWALLOWING: 0
CHEST TIGHTNESS: 0

## 2023-08-22 ASSESSMENT — PAIN DESCRIPTION - ORIENTATION: ORIENTATION: LEFT;LOWER

## 2023-08-22 ASSESSMENT — PAIN DESCRIPTION - LOCATION: LOCATION: LEG

## 2023-08-22 ASSESSMENT — PAIN SCALES - GENERAL: PAINLEVEL_OUTOF10: 7

## 2023-08-22 ASSESSMENT — PAIN - FUNCTIONAL ASSESSMENT: PAIN_FUNCTIONAL_ASSESSMENT: 0-10

## 2023-08-22 NOTE — ED TRIAGE NOTES
Patient ambulatory through triage with complaints of 2 yellow jackets that stung him in the back of his right lower extremity yesterday. Patient denies any sob, breathing problems at this time.

## 2023-09-18 ENCOUNTER — HOSPITAL ENCOUNTER (EMERGENCY)
Facility: HOSPITAL | Age: 53
Discharge: HOME | End: 2023-09-18
Attending: STUDENT IN AN ORGANIZED HEALTH CARE EDUCATION/TRAINING PROGRAM
Payer: MEDICAID

## 2023-09-18 VITALS
SYSTOLIC BLOOD PRESSURE: 131 MMHG | TEMPERATURE: 97.6 F | OXYGEN SATURATION: 95 % | DIASTOLIC BLOOD PRESSURE: 87 MMHG | HEART RATE: 91 BPM | RESPIRATION RATE: 16 BRPM

## 2023-09-18 DIAGNOSIS — J32.0 CHRONIC MAXILLARY SINUSITIS: Primary | ICD-10-CM

## 2023-09-18 DIAGNOSIS — G43.809 OTHER MIGRAINE WITHOUT STATUS MIGRAINOSUS, NOT INTRACTABLE: ICD-10-CM

## 2023-09-18 LAB
ALBUMIN SERPL-MCNC: 4.2 G/DL (ref 3.5–5)
ALBUMIN/GLOB SERPL: 1.1 (ref 1.1–2.2)
ALP SERPL-CCNC: 86 U/L (ref 45–117)
ALT SERPL-CCNC: 42 U/L (ref 12–78)
ANION GAP SERPL CALC-SCNC: 0 MMOL/L (ref 5–15)
AST SERPL-CCNC: 18 U/L (ref 15–37)
BASOPHILS # BLD: 0 K/UL (ref 0–0.1)
BASOPHILS NFR BLD: 1 % (ref 0–1)
BILIRUB SERPL-MCNC: 0.4 MG/DL (ref 0.2–1)
BUN SERPL-MCNC: 11 MG/DL (ref 6–20)
BUN/CREAT SERPL: 10 (ref 12–20)
CALCIUM SERPL-MCNC: 9.7 MG/DL (ref 8.5–10.1)
CHLORIDE SERPL-SCNC: 108 MMOL/L (ref 97–108)
CO2 SERPL-SCNC: 28 MMOL/L (ref 21–32)
CREAT SERPL-MCNC: 1.1 MG/DL (ref 0.7–1.3)
DIFFERENTIAL METHOD BLD: ABNORMAL
EOSINOPHIL # BLD: 0.1 K/UL (ref 0–0.4)
EOSINOPHIL NFR BLD: 1 % (ref 0–7)
ERYTHROCYTE [DISTWIDTH] IN BLOOD BY AUTOMATED COUNT: 17.9 % (ref 11.5–14.5)
GLOBULIN SER CALC-MCNC: 3.9 G/DL (ref 2–4)
GLUCOSE SERPL-MCNC: 113 MG/DL (ref 65–100)
HCT VFR BLD AUTO: 50.3 % (ref 36.6–50.3)
HGB BLD-MCNC: 15.9 G/DL (ref 12.1–17)
IMM GRANULOCYTES # BLD AUTO: 0 K/UL (ref 0–0.04)
IMM GRANULOCYTES NFR BLD AUTO: 0 % (ref 0–0.5)
LYMPHOCYTES # BLD: 1.4 K/UL (ref 0.8–3.5)
LYMPHOCYTES NFR BLD: 22 % (ref 12–49)
MCH RBC QN AUTO: 22.8 PG (ref 26–34)
MCHC RBC AUTO-ENTMCNC: 31.6 G/DL (ref 30–36.5)
MCV RBC AUTO: 72.1 FL (ref 80–99)
MONOCYTES # BLD: 0.5 K/UL (ref 0–1)
MONOCYTES NFR BLD: 8 % (ref 5–13)
NEUTS SEG # BLD: 4.4 K/UL (ref 1.8–8)
NEUTS SEG NFR BLD: 68 % (ref 32–75)
NRBC # BLD: 0 K/UL (ref 0–0.01)
NRBC BLD-RTO: 0 PER 100 WBC
PLATELET # BLD AUTO: 190 K/UL (ref 150–400)
PMV BLD AUTO: 10.1 FL (ref 8.9–12.9)
POTASSIUM SERPL-SCNC: 4.2 MMOL/L (ref 3.5–5.1)
PROT SERPL-MCNC: 8.1 G/DL (ref 6.4–8.2)
RBC # BLD AUTO: 6.98 M/UL (ref 4.1–5.7)
SARS-COV-2 RDRP RESP QL NAA+PROBE: NOT DETECTED
SODIUM SERPL-SCNC: 136 MMOL/L (ref 136–145)
SOURCE: NORMAL
WBC # BLD AUTO: 6.5 K/UL (ref 4.1–11.1)

## 2023-09-18 PROCEDURE — 2580000003 HC RX 258: Performed by: NURSE PRACTITIONER

## 2023-09-18 PROCEDURE — 36415 COLL VENOUS BLD VENIPUNCTURE: CPT

## 2023-09-18 PROCEDURE — 80053 COMPREHEN METABOLIC PANEL: CPT

## 2023-09-18 PROCEDURE — 96374 THER/PROPH/DIAG INJ IV PUSH: CPT

## 2023-09-18 PROCEDURE — 96375 TX/PRO/DX INJ NEW DRUG ADDON: CPT

## 2023-09-18 PROCEDURE — 99284 EMERGENCY DEPT VISIT MOD MDM: CPT

## 2023-09-18 PROCEDURE — 87635 SARS-COV-2 COVID-19 AMP PRB: CPT

## 2023-09-18 PROCEDURE — 85025 COMPLETE CBC W/AUTO DIFF WBC: CPT

## 2023-09-18 PROCEDURE — 6360000002 HC RX W HCPCS: Performed by: NURSE PRACTITIONER

## 2023-09-18 RX ORDER — NAPROXEN 500 MG/1
500 TABLET ORAL 2 TIMES DAILY
Qty: 30 TABLET | Refills: 0 | Status: SHIPPED | OUTPATIENT
Start: 2023-09-18 | End: 2023-10-03

## 2023-09-18 RX ORDER — PREDNISONE 20 MG/1
40 TABLET ORAL DAILY
Qty: 10 TABLET | Refills: 0 | Status: SHIPPED | OUTPATIENT
Start: 2023-09-18 | End: 2023-09-23

## 2023-09-18 RX ORDER — AMOXICILLIN AND CLAVULANATE POTASSIUM 875; 125 MG/1; MG/1
1 TABLET, FILM COATED ORAL 2 TIMES DAILY
Qty: 14 TABLET | Refills: 0 | Status: SHIPPED | OUTPATIENT
Start: 2023-09-18 | End: 2023-09-25

## 2023-09-18 RX ORDER — PROCHLORPERAZINE EDISYLATE 5 MG/ML
10 INJECTION INTRAMUSCULAR; INTRAVENOUS ONCE
Status: COMPLETED | OUTPATIENT
Start: 2023-09-18 | End: 2023-09-18

## 2023-09-18 RX ORDER — SODIUM CHLORIDE 9 MG/ML
INJECTION, SOLUTION INTRAVENOUS ONCE
Status: COMPLETED | OUTPATIENT
Start: 2023-09-18 | End: 2023-09-18

## 2023-09-18 RX ORDER — KETOROLAC TROMETHAMINE 30 MG/ML
30 INJECTION, SOLUTION INTRAMUSCULAR; INTRAVENOUS
Status: COMPLETED | OUTPATIENT
Start: 2023-09-18 | End: 2023-09-18

## 2023-09-18 RX ADMIN — SODIUM CHLORIDE: 9 INJECTION, SOLUTION INTRAVENOUS at 12:50

## 2023-09-18 RX ADMIN — KETOROLAC TROMETHAMINE 30 MG: 30 INJECTION, SOLUTION INTRAMUSCULAR; INTRAVENOUS at 12:50

## 2023-09-18 RX ADMIN — PROCHLORPERAZINE EDISYLATE 10 MG: 5 INJECTION INTRAMUSCULAR; INTRAVENOUS at 12:50

## 2023-09-18 ASSESSMENT — PAIN SCALES - GENERAL
PAINLEVEL_OUTOF10: 8
PAINLEVEL_OUTOF10: 3

## 2023-09-18 ASSESSMENT — ENCOUNTER SYMPTOMS
SORE THROAT: 0
SINUS PRESSURE: 1
ABDOMINAL PAIN: 0
SINUS PAIN: 1
DIARRHEA: 0
COUGH: 1
VOMITING: 0
TROUBLE SWALLOWING: 0
NAUSEA: 0
SHORTNESS OF BREATH: 0

## 2023-09-18 ASSESSMENT — PAIN DESCRIPTION - DESCRIPTORS
DESCRIPTORS: ACHING
DESCRIPTORS: ACHING

## 2023-09-18 ASSESSMENT — PAIN DESCRIPTION - LOCATION
LOCATION: HEAD
LOCATION: HEAD

## 2023-09-18 NOTE — ED TRIAGE NOTES
Pt c/o sinus infection, headache and body aches, shameka feels \"like Covid\" , denies fever, +chills, +loss of taste

## 2023-09-18 NOTE — ED PROVIDER NOTES
[facility] EMERGENCY DEP  EMERGENCY DEPARTMENT ENCOUNTER      Pt Name: Jnuior Ríos  MRN: [id number]  [address] [facility] [place] 1970  Date of evaluation: 9/18/2023  Provider: CLARE Reina - SHAUN    CHIEF COMPLAINT       Chief Complaint   Patient presents with    Headache         HISTORY OF PRESENT ILLNESS   (Location/Symptom, Timing/Onset, Context/Setting, Quality, Duration, Modifying Factors, Severity)  Note limiting factors. 47 y/o male with HX back pain patient states that when the seasons change and he always gets like this, says that symptoms have been getting worse over the last week plus including headache-patient states that he does get Botox injections in which he got recently, generalized body aches, concern for COVID with chills and loss of taste. Patient denies any fever, chest pain, shortness of breath, abdominal pain, nausea, vomiting or diarrhea      The history is provided by the patient. Review of External Medical Records:     Nursing Notes were reviewed. REVIEW OF SYSTEMS    (2-9 systems for level 4, 10 or more for level 5)     Review of Systems   Constitutional:  Positive for chills. Negative for fever. Loss of taste   HENT:  Positive for congestion, sinus pressure and sinus pain. Negative for sore throat and trouble swallowing. Respiratory:  Positive for cough. Negative for shortness of breath. Cardiovascular:  Negative for chest pain. Gastrointestinal:  Negative for abdominal pain, diarrhea, nausea and vomiting. Genitourinary:  Negative for difficulty urinating. Musculoskeletal: Negative. Neurological: Negative. Psychiatric/Behavioral: Negative. Except as noted above the remainder of the review of systems was reviewed and negative.        PAST MEDICAL HISTORY     Past Medical History:   Diagnosis Date    Back pain          SURGICAL HISTORY       Past Surgical History:   Procedure Laterality Date    OTHER SURGICAL HISTORY      Nasal bridge fracture

## 2023-10-08 ENCOUNTER — HOSPITAL ENCOUNTER (EMERGENCY)
Facility: HOSPITAL | Age: 53
Discharge: HOME | End: 2023-10-08
Attending: EMERGENCY MEDICINE
Payer: MEDICAID

## 2023-10-08 ENCOUNTER — APPOINTMENT (OUTPATIENT)
Facility: HOSPITAL | Age: 53
End: 2023-10-08
Payer: MEDICAID

## 2023-10-08 VITALS
TEMPERATURE: 97.7 F | SYSTOLIC BLOOD PRESSURE: 143 MMHG | DIASTOLIC BLOOD PRESSURE: 91 MMHG | RESPIRATION RATE: 18 BRPM | HEART RATE: 84 BPM | OXYGEN SATURATION: 93 %

## 2023-10-08 DIAGNOSIS — R42 DIZZINESS: ICD-10-CM

## 2023-10-08 DIAGNOSIS — R05.9 COUGH, UNSPECIFIED TYPE: ICD-10-CM

## 2023-10-08 DIAGNOSIS — R30.0 DYSURIA: Primary | ICD-10-CM

## 2023-10-08 LAB
ALBUMIN SERPL-MCNC: 3.6 G/DL (ref 3.5–5)
ALBUMIN/GLOB SERPL: 1 (ref 1.1–2.2)
ALP SERPL-CCNC: 76 U/L (ref 45–117)
ALT SERPL-CCNC: 38 U/L (ref 12–78)
ANION GAP SERPL CALC-SCNC: 4 MMOL/L (ref 5–15)
APPEARANCE UR: CLEAR
AST SERPL-CCNC: 42 U/L (ref 15–37)
BACTERIA URNS QL MICRO: NEGATIVE /HPF
BASOPHILS # BLD: 0 K/UL (ref 0–0.1)
BASOPHILS NFR BLD: 0 % (ref 0–1)
BILIRUB SERPL-MCNC: 0.5 MG/DL (ref 0.2–1)
BILIRUB UR QL: NEGATIVE
BUN SERPL-MCNC: 6 MG/DL (ref 6–20)
BUN/CREAT SERPL: 7 (ref 12–20)
CALCIUM SERPL-MCNC: 8.9 MG/DL (ref 8.5–10.1)
CAOX CRY URNS QL MICRO: ABNORMAL
CHLORIDE SERPL-SCNC: 105 MMOL/L (ref 97–108)
CO2 SERPL-SCNC: 30 MMOL/L (ref 21–32)
COLOR UR: ABNORMAL
COMMENT:: NORMAL
CREAT SERPL-MCNC: 0.9 MG/DL (ref 0.7–1.3)
DIFFERENTIAL METHOD BLD: ABNORMAL
EOSINOPHIL # BLD: 0.1 K/UL (ref 0–0.4)
EOSINOPHIL NFR BLD: 2 % (ref 0–7)
EPITH CASTS URNS QL MICRO: ABNORMAL /LPF
ERYTHROCYTE [DISTWIDTH] IN BLOOD BY AUTOMATED COUNT: 15.9 % (ref 11.5–14.5)
GLOBULIN SER CALC-MCNC: 3.7 G/DL (ref 2–4)
GLUCOSE SERPL-MCNC: 120 MG/DL (ref 65–100)
GLUCOSE UR STRIP.AUTO-MCNC: NEGATIVE MG/DL
HCT VFR BLD AUTO: 42.3 % (ref 36.6–50.3)
HGB BLD-MCNC: 13.6 G/DL (ref 12.1–17)
HGB UR QL STRIP: NEGATIVE
IMM GRANULOCYTES # BLD AUTO: 0 K/UL (ref 0–0.04)
IMM GRANULOCYTES NFR BLD AUTO: 0 % (ref 0–0.5)
KETONES UR QL STRIP.AUTO: ABNORMAL MG/DL
LEUKOCYTE ESTERASE UR QL STRIP.AUTO: NEGATIVE
LYMPHOCYTES # BLD: 1.7 K/UL (ref 0.8–3.5)
LYMPHOCYTES NFR BLD: 22 % (ref 12–49)
MCH RBC QN AUTO: 23.1 PG (ref 26–34)
MCHC RBC AUTO-ENTMCNC: 32.2 G/DL (ref 30–36.5)
MCV RBC AUTO: 71.7 FL (ref 80–99)
MONOCYTES # BLD: 0.6 K/UL (ref 0–1)
MONOCYTES NFR BLD: 8 % (ref 5–13)
NEUTS SEG # BLD: 5.1 K/UL (ref 1.8–8)
NEUTS SEG NFR BLD: 68 % (ref 32–75)
NITRITE UR QL STRIP.AUTO: NEGATIVE
NRBC # BLD: 0 K/UL (ref 0–0.01)
NRBC BLD-RTO: 0 PER 100 WBC
PH UR STRIP: 5.5 (ref 5–8)
PLATELET # BLD AUTO: 228 K/UL (ref 150–400)
PMV BLD AUTO: 10.3 FL (ref 8.9–12.9)
POTASSIUM SERPL-SCNC: 4.1 MMOL/L (ref 3.5–5.1)
PROT SERPL-MCNC: 7.3 G/DL (ref 6.4–8.2)
PROT UR STRIP-MCNC: NEGATIVE MG/DL
RBC # BLD AUTO: 5.9 M/UL (ref 4.1–5.7)
RBC #/AREA URNS HPF: ABNORMAL /HPF (ref 0–5)
SODIUM SERPL-SCNC: 139 MMOL/L (ref 136–145)
SP GR UR REFRACTOMETRY: 1.02 (ref 1–1.03)
SPECIMEN HOLD: NORMAL
TROPONIN I SERPL HS-MCNC: 4 NG/L (ref 0–76)
URINE CULTURE IF INDICATED: ABNORMAL
UROBILINOGEN UR QL STRIP.AUTO: 1 EU/DL (ref 0.2–1)
WBC # BLD AUTO: 7.6 K/UL (ref 4.1–11.1)
WBC URNS QL MICRO: ABNORMAL /HPF (ref 0–4)

## 2023-10-08 PROCEDURE — 2500000003 HC RX 250 WO HCPCS

## 2023-10-08 PROCEDURE — 74176 CT ABD & PELVIS W/O CONTRAST: CPT

## 2023-10-08 PROCEDURE — A4216 STERILE WATER/SALINE, 10 ML: HCPCS

## 2023-10-08 PROCEDURE — 80053 COMPREHEN METABOLIC PANEL: CPT

## 2023-10-08 PROCEDURE — 96374 THER/PROPH/DIAG INJ IV PUSH: CPT

## 2023-10-08 PROCEDURE — 36415 COLL VENOUS BLD VENIPUNCTURE: CPT

## 2023-10-08 PROCEDURE — 71046 X-RAY EXAM CHEST 2 VIEWS: CPT

## 2023-10-08 PROCEDURE — 93005 ELECTROCARDIOGRAM TRACING: CPT

## 2023-10-08 PROCEDURE — 6370000000 HC RX 637 (ALT 250 FOR IP)

## 2023-10-08 PROCEDURE — 85025 COMPLETE CBC W/AUTO DIFF WBC: CPT

## 2023-10-08 PROCEDURE — 2580000003 HC RX 258

## 2023-10-08 PROCEDURE — 99285 EMERGENCY DEPT VISIT HI MDM: CPT

## 2023-10-08 PROCEDURE — 84484 ASSAY OF TROPONIN QUANT: CPT

## 2023-10-08 PROCEDURE — 81001 URINALYSIS AUTO W/SCOPE: CPT

## 2023-10-08 RX ORDER — 0.9 % SODIUM CHLORIDE 0.9 %
1000 INTRAVENOUS SOLUTION INTRAVENOUS ONCE
Status: COMPLETED | OUTPATIENT
Start: 2023-10-08 | End: 2023-10-08

## 2023-10-08 RX ORDER — MECLIZINE HYDROCHLORIDE 25 MG/1
25 TABLET ORAL 3 TIMES DAILY PRN
Qty: 15 TABLET | Refills: 0 | Status: SHIPPED | OUTPATIENT
Start: 2023-10-08 | End: 2023-10-13

## 2023-10-08 RX ORDER — POLYETHYLENE GLYCOL 3350 17 G/17G
17 POWDER, FOR SOLUTION ORAL DAILY PRN
Qty: 255 G | Refills: 0 | Status: SHIPPED | OUTPATIENT
Start: 2023-10-08 | End: 2023-10-22

## 2023-10-08 RX ORDER — MECLIZINE HCL 12.5 MG/1
25 TABLET ORAL
Status: COMPLETED | OUTPATIENT
Start: 2023-10-08 | End: 2023-10-08

## 2023-10-08 RX ADMIN — SODIUM CHLORIDE 1000 ML: 9 INJECTION, SOLUTION INTRAVENOUS at 14:08

## 2023-10-08 RX ADMIN — FAMOTIDINE 20 MG: 10 INJECTION INTRAVENOUS at 14:10

## 2023-10-08 RX ADMIN — MECLIZINE 25 MG: 12.5 TABLET ORAL at 14:09

## 2023-10-08 ASSESSMENT — PAIN - FUNCTIONAL ASSESSMENT: PAIN_FUNCTIONAL_ASSESSMENT: NONE - DENIES PAIN

## 2023-10-08 NOTE — ED NOTES
Patient left ED in no acute distress, alert and oriented x4. Patient was encouraged to come back if symptoms get worse. Patient was provided with discharge instructions and prescriptions. All questions were answered. Patient left ambulatory.          Wayne Goodson [place]  63/36/47 [id number]

## 2023-10-08 NOTE — ED PROVIDER NOTES
medication side effect, CHF, lung cancer, or mass. Differential diagnosis for dysuria includes, but is not limited to, cystitis, pyelonephritis, nephrolithiasis. Abdominal exam without distention, tenderness, or peritoneal signs. There is no evidence of acute abdomen at this time. Urinalysis without signs of infection. CT abdomen pelvis negative for calculus. Did note constipation. Patient is safe for discharge home at this time. Will provide with prescription for meclizine to be taken as needed. Will also provide a prescription for MiraLAX secondary to the constipation. Return precautions discussed with patient who expresses understanding and is in agreement with the current plan. Recommend follow-up with primary care provider in 2 to 3 days. Amount and/or Complexity of Data Reviewed  Labs: ordered. Radiology: ordered. ECG/medicine tests: ordered. Risk  Prescription drug management. ED Course as of 10/08/23 1459   Sun Oct 08, 2023   1300 ED EKG interpretation:  Rhythm: normal sinus rhythm; and regular . Rate (approx.): 71; Axis: normal; P wave: normal; QRS interval: normal ; ST/T wave: normal;  This EKG was interpreted by Earnest Jiang MD,ED Provider.   [CJ]      ED Course User Index  [CJ] Sherri Mclaughlin MD         FINAL IMPRESSION      1. Dysuria    2. Dizziness    3.  Cough, unspecified type          DISPOSITION/PLAN   DISPOSITION Decision To Discharge 10/08/2023 02:40:50 PM      PATIENT REFERRED TO:  [facility] EMERGENCY [address] [facility]  [phone]    As needed, If symptoms worsen    Your PCP    In 2 days        DISCHARGE MEDICATIONS:  New Prescriptions    MECLIZINE (ANTIVERT) 25 MG TABLET    Take 1 tablet by mouth 3 times daily as needed for Dizziness    POLYETHYLENE GLYCOL (GLYCOLAX) 17 GM/SCOOP POWDER    Take 17 g by mouth daily as needed (constipation)         (Please note that portions of this note were completed with a voice

## 2023-10-10 LAB
EKG ATRIAL RATE: 71 BPM
EKG DIAGNOSIS: NORMAL
EKG P AXIS: 64 DEGREES
EKG P-R INTERVAL: 128 MS
EKG Q-T INTERVAL: 376 MS
EKG QRS DURATION: 86 MS
EKG QTC CALCULATION (BAZETT): 408 MS
EKG R AXIS: 60 DEGREES
EKG T AXIS: 23 DEGREES
EKG VENTRICULAR RATE: 71 BPM

## 2023-10-10 PROCEDURE — 93010 ELECTROCARDIOGRAM REPORT: CPT | Performed by: INTERNAL MEDICINE

## 2023-10-24 ENCOUNTER — APPOINTMENT (OUTPATIENT)
Facility: HOSPITAL | Age: 53
End: 2023-10-24
Payer: MEDICAID

## 2023-10-24 ENCOUNTER — HOSPITAL ENCOUNTER (EMERGENCY)
Facility: HOSPITAL | Age: 53
Discharge: HOME | End: 2023-10-24
Attending: EMERGENCY MEDICINE
Payer: MEDICAID

## 2023-10-24 VITALS
DIASTOLIC BLOOD PRESSURE: 82 MMHG | RESPIRATION RATE: 16 BRPM | SYSTOLIC BLOOD PRESSURE: 125 MMHG | OXYGEN SATURATION: 100 % | TEMPERATURE: 98.5 F | HEART RATE: 100 BPM

## 2023-10-24 DIAGNOSIS — K59.00 CONSTIPATION, UNSPECIFIED CONSTIPATION TYPE: Primary | ICD-10-CM

## 2023-10-24 DIAGNOSIS — R10.31 RIGHT LOWER QUADRANT ABDOMINAL PAIN: ICD-10-CM

## 2023-10-24 LAB
ALBUMIN SERPL-MCNC: 3.7 G/DL (ref 3.5–5)
ALBUMIN/GLOB SERPL: 0.9 (ref 1.1–2.2)
ALP SERPL-CCNC: 76 U/L (ref 45–117)
ALT SERPL-CCNC: 30 U/L (ref 12–78)
AMORPH CRY URNS QL MICRO: ABNORMAL
ANION GAP SERPL CALC-SCNC: 4 MMOL/L (ref 5–15)
APPEARANCE UR: CLEAR
AST SERPL-CCNC: 17 U/L (ref 15–37)
BACTERIA URNS QL MICRO: NEGATIVE /HPF
BASOPHILS # BLD: 0 K/UL (ref 0–0.1)
BASOPHILS NFR BLD: 0 % (ref 0–1)
BILIRUB SERPL-MCNC: 0.6 MG/DL (ref 0.2–1)
BILIRUB UR QL CFM: NEGATIVE
BUN SERPL-MCNC: 13 MG/DL (ref 6–20)
BUN/CREAT SERPL: 15 (ref 12–20)
CALCIUM SERPL-MCNC: 9.5 MG/DL (ref 8.5–10.1)
CHLORIDE SERPL-SCNC: 105 MMOL/L (ref 97–108)
CO2 SERPL-SCNC: 27 MMOL/L (ref 21–32)
COLOR UR: ABNORMAL
COMMENT:: NORMAL
CREAT SERPL-MCNC: 0.84 MG/DL (ref 0.7–1.3)
DIFFERENTIAL METHOD BLD: ABNORMAL
EKG ATRIAL RATE: 94 BPM
EKG DIAGNOSIS: NORMAL
EKG P AXIS: 82 DEGREES
EKG P-R INTERVAL: 130 MS
EKG Q-T INTERVAL: 330 MS
EKG QRS DURATION: 74 MS
EKG QTC CALCULATION (BAZETT): 412 MS
EKG R AXIS: 78 DEGREES
EKG T AXIS: 26 DEGREES
EKG VENTRICULAR RATE: 94 BPM
EOSINOPHIL # BLD: 0 K/UL (ref 0–0.4)
EOSINOPHIL NFR BLD: 1 % (ref 0–7)
EPITH CASTS URNS QL MICRO: ABNORMAL /LPF
ERYTHROCYTE [DISTWIDTH] IN BLOOD BY AUTOMATED COUNT: 16.5 % (ref 11.5–14.5)
GLOBULIN SER CALC-MCNC: 4.3 G/DL (ref 2–4)
GLUCOSE SERPL-MCNC: 128 MG/DL (ref 65–100)
GLUCOSE UR STRIP.AUTO-MCNC: NEGATIVE MG/DL
HCT VFR BLD AUTO: 45.7 % (ref 36.6–50.3)
HGB BLD-MCNC: 14.6 G/DL (ref 12.1–17)
HGB UR QL STRIP: NEGATIVE
IMM GRANULOCYTES # BLD AUTO: 0 K/UL (ref 0–0.04)
IMM GRANULOCYTES NFR BLD AUTO: 0 % (ref 0–0.5)
KETONES UR QL STRIP.AUTO: ABNORMAL MG/DL
LEUKOCYTE ESTERASE UR QL STRIP.AUTO: NEGATIVE
LIPASE SERPL-CCNC: 33 U/L (ref 13–75)
LYMPHOCYTES # BLD: 1 K/UL (ref 0.8–3.5)
LYMPHOCYTES NFR BLD: 17 % (ref 12–49)
MAGNESIUM SERPL-MCNC: 2 MG/DL (ref 1.6–2.4)
MCH RBC QN AUTO: 23 PG (ref 26–34)
MCHC RBC AUTO-ENTMCNC: 31.9 G/DL (ref 30–36.5)
MCV RBC AUTO: 72.1 FL (ref 80–99)
MONOCYTES # BLD: 0.4 K/UL (ref 0–1)
MONOCYTES NFR BLD: 7 % (ref 5–13)
MUCOUS THREADS URNS QL MICRO: ABNORMAL /LPF
NEUTS SEG # BLD: 4.3 K/UL (ref 1.8–8)
NEUTS SEG NFR BLD: 75 % (ref 32–75)
NITRITE UR QL STRIP.AUTO: NEGATIVE
NRBC # BLD: 0 K/UL (ref 0–0.01)
NRBC BLD-RTO: 0 PER 100 WBC
PH UR STRIP: 7.5 (ref 5–8)
PLATELET # BLD AUTO: 250 K/UL (ref 150–400)
PMV BLD AUTO: 11 FL (ref 8.9–12.9)
POTASSIUM SERPL-SCNC: 4 MMOL/L (ref 3.5–5.1)
PROT SERPL-MCNC: 8 G/DL (ref 6.4–8.2)
PROT UR STRIP-MCNC: NEGATIVE MG/DL
RBC # BLD AUTO: 6.34 M/UL (ref 4.1–5.7)
RBC #/AREA URNS HPF: ABNORMAL /HPF (ref 0–5)
SODIUM SERPL-SCNC: 136 MMOL/L (ref 136–145)
SP GR UR REFRACTOMETRY: 1.01 (ref 1–1.03)
SPECIMEN HOLD: NORMAL
SPECIMEN HOLD: NORMAL
UROBILINOGEN UR QL STRIP.AUTO: 0.2 EU/DL (ref 0.2–1)
WBC # BLD AUTO: 5.7 K/UL (ref 4.1–11.1)
WBC URNS QL MICRO: ABNORMAL /HPF (ref 0–4)

## 2023-10-24 PROCEDURE — 93005 ELECTROCARDIOGRAM TRACING: CPT | Performed by: EMERGENCY MEDICINE

## 2023-10-24 PROCEDURE — 74177 CT ABD & PELVIS W/CONTRAST: CPT

## 2023-10-24 PROCEDURE — 83690 ASSAY OF LIPASE: CPT

## 2023-10-24 PROCEDURE — 85025 COMPLETE CBC W/AUTO DIFF WBC: CPT

## 2023-10-24 PROCEDURE — 2580000003 HC RX 258: Performed by: EMERGENCY MEDICINE

## 2023-10-24 PROCEDURE — 6360000002 HC RX W HCPCS: Performed by: EMERGENCY MEDICINE

## 2023-10-24 PROCEDURE — 81001 URINALYSIS AUTO W/SCOPE: CPT

## 2023-10-24 PROCEDURE — 80053 COMPREHEN METABOLIC PANEL: CPT

## 2023-10-24 PROCEDURE — 96374 THER/PROPH/DIAG INJ IV PUSH: CPT

## 2023-10-24 PROCEDURE — 36415 COLL VENOUS BLD VENIPUNCTURE: CPT

## 2023-10-24 PROCEDURE — 83735 ASSAY OF MAGNESIUM: CPT

## 2023-10-24 PROCEDURE — 93010 ELECTROCARDIOGRAM REPORT: CPT | Performed by: SPECIALIST

## 2023-10-24 PROCEDURE — 6360000004 HC RX CONTRAST MEDICATION: Performed by: RADIOLOGY

## 2023-10-24 PROCEDURE — 99285 EMERGENCY DEPT VISIT HI MDM: CPT

## 2023-10-24 RX ORDER — KETOROLAC TROMETHAMINE 30 MG/ML
30 INJECTION, SOLUTION INTRAMUSCULAR; INTRAVENOUS
Status: COMPLETED | OUTPATIENT
Start: 2023-10-24 | End: 2023-10-24

## 2023-10-24 RX ORDER — 0.9 % SODIUM CHLORIDE 0.9 %
1000 INTRAVENOUS SOLUTION INTRAVENOUS ONCE
Status: COMPLETED | OUTPATIENT
Start: 2023-10-24 | End: 2023-10-24

## 2023-10-24 RX ADMIN — SODIUM CHLORIDE 1000 ML: 9 INJECTION, SOLUTION INTRAVENOUS at 10:54

## 2023-10-24 RX ADMIN — KETOROLAC TROMETHAMINE 30 MG: 30 INJECTION, SOLUTION INTRAMUSCULAR; INTRAVENOUS at 14:57

## 2023-10-24 RX ADMIN — IOPAMIDOL 100 ML: 755 INJECTION, SOLUTION INTRAVENOUS at 13:54

## 2023-10-24 ASSESSMENT — LIFESTYLE VARIABLES
HOW MANY STANDARD DRINKS CONTAINING ALCOHOL DO YOU HAVE ON A TYPICAL DAY: PATIENT DOES NOT DRINK
HOW OFTEN DO YOU HAVE A DRINK CONTAINING ALCOHOL: PATIENT DECLINED
HOW OFTEN DO YOU HAVE A DRINK CONTAINING ALCOHOL: NEVER

## 2023-10-24 ASSESSMENT — ENCOUNTER SYMPTOMS
SORE THROAT: 0
BACK PAIN: 0
DIARRHEA: 1
COUGH: 0
ABDOMINAL PAIN: 1

## 2023-10-24 ASSESSMENT — PAIN DESCRIPTION - LOCATION: LOCATION: ABDOMEN;BACK

## 2023-10-24 ASSESSMENT — PAIN SCALES - GENERAL: PAINLEVEL_OUTOF10: 9

## 2023-10-24 ASSESSMENT — PAIN DESCRIPTION - PAIN TYPE: TYPE: CHRONIC PAIN

## 2023-10-24 ASSESSMENT — PAIN DESCRIPTION - FREQUENCY: FREQUENCY: CONTINUOUS

## 2023-10-24 ASSESSMENT — PAIN - FUNCTIONAL ASSESSMENT: PAIN_FUNCTIONAL_ASSESSMENT: PREVENTS OR INTERFERES SOME ACTIVE ACTIVITIES AND ADLS

## 2023-10-24 ASSESSMENT — PAIN DESCRIPTION - ORIENTATION: ORIENTATION: LOWER

## 2023-10-24 ASSESSMENT — PAIN DESCRIPTION - DESCRIPTORS: DESCRIPTORS: PATIENT UNABLE TO DESCRIBE

## 2023-10-24 ASSESSMENT — PAIN DESCRIPTION - ONSET: ONSET: PROGRESSIVE

## 2023-10-24 NOTE — ED TRIAGE NOTES
Pt c/o constipation with a hard time urinating, denies fever, +nausea, here last week for the same, +sweating at night , 20 lb weight loss in one month, denies dark tarry or blood in stools, +abd pain, +sob +cough

## 2023-10-24 NOTE — ED NOTES
Patient given discharge instructions. Ambulatory out of unit with steady gait.      Yoni Palmer RN  10/24/23 [id number]

## 2023-10-24 NOTE — ED PROVIDER NOTES
[facility] EMERGENCY DEP  EMERGENCY DEPARTMENT ENCOUNTER      Pt Name: Yanelis White  MRN: [id number]  [address] [facility] [place] 1970  Date of evaluation: 10/24/2023  Provider: Artem Rodriguez MD    CHIEF COMPLAINT       Chief Complaint   Patient presents with    Abdominal Pain         HISTORY OF PRESENT ILLNESS    Mauro Altman is a 49 yo M with abdominal pain and constipation and difficulty urinating and nausea. He was seen here 2 weeks ago and had a non contrast CT of abdomen that showed constipation. He states he has been taking miralax which has not been helping. He is able to have BMs but has to strain a lot. He has also had night sweats and a 20 lb weight loss over the past 2 months and is worried tht something is wrong. He denies black tarry or bloody stools. Additional history from independent historians:     Review of External Medical Records:     Nursing Notes were reviewed. REVIEW OF SYSTEMS       Review of Systems   Constitutional:  Positive for unexpected weight change. Negative for fever. Diaphoresis: night sweats. HENT:  Negative for sore throat. Eyes:  Negative for visual disturbance. Respiratory:  Negative for cough. Cardiovascular:  Negative for chest pain. Gastrointestinal:  Positive for abdominal pain and diarrhea. Genitourinary:  Positive for difficulty urinating. Negative for dysuria. Musculoskeletal:  Negative for back pain. Skin:  Negative for rash. Neurological:  Negative for headaches. Except as noted above the remainder of the review of systems was reviewed and negative.        PAST MEDICAL HISTORY     Past Medical History:   Diagnosis Date    Back pain          SURGICAL HISTORY       Past Surgical History:   Procedure Laterality Date    OTHER SURGICAL HISTORY      Nasal bridge fracture         CURRENT MEDICATIONS       Previous Medications    ALBUTEROL SULFATE HFA (PROVENTIL;VENTOLIN;PROAIR) 108 (90 BASE) MCG/ACT INHALER    Inhale 1 puff into the lungs

## 2024-08-09 ENCOUNTER — HOSPITAL ENCOUNTER (EMERGENCY)
Facility: HOSPITAL | Age: 54
Discharge: HOME | End: 2024-08-09
Attending: STUDENT IN AN ORGANIZED HEALTH CARE EDUCATION/TRAINING PROGRAM
Payer: MEDICAID

## 2024-08-09 VITALS
OXYGEN SATURATION: 98 % | DIASTOLIC BLOOD PRESSURE: 80 MMHG | SYSTOLIC BLOOD PRESSURE: 122 MMHG | HEART RATE: 91 BPM | WEIGHT: 176.15 LBS | BODY MASS INDEX: 26.01 KG/M2 | TEMPERATURE: 97.8 F | RESPIRATION RATE: 16 BRPM

## 2024-08-09 DIAGNOSIS — K08.89 PAIN, DENTAL: ICD-10-CM

## 2024-08-09 DIAGNOSIS — H92.01 RIGHT EAR PAIN: ICD-10-CM

## 2024-08-09 DIAGNOSIS — J02.0 STREP PHARYNGITIS: Primary | ICD-10-CM

## 2024-08-09 LAB
FLUAV RNA SPEC QL NAA+PROBE: NOT DETECTED
FLUBV RNA SPEC QL NAA+PROBE: NOT DETECTED
S PYO AG THROAT QL: POSITIVE
SARS-COV-2 RNA RESP QL NAA+PROBE: NOT DETECTED

## 2024-08-09 PROCEDURE — 6360000002 HC RX W HCPCS: Performed by: EMERGENCY MEDICINE

## 2024-08-09 PROCEDURE — 99283 EMERGENCY DEPT VISIT LOW MDM: CPT

## 2024-08-09 PROCEDURE — 87880 STREP A ASSAY W/OPTIC: CPT

## 2024-08-09 PROCEDURE — 87636 SARSCOV2 & INF A&B AMP PRB: CPT

## 2024-08-09 PROCEDURE — 87070 CULTURE OTHR SPECIMN AEROBIC: CPT

## 2024-08-09 PROCEDURE — 6370000000 HC RX 637 (ALT 250 FOR IP): Performed by: EMERGENCY MEDICINE

## 2024-08-09 RX ORDER — AMOXICILLIN AND CLAVULANATE POTASSIUM 875; 125 MG/1; MG/1
1 TABLET, FILM COATED ORAL 2 TIMES DAILY
Qty: 20 TABLET | Refills: 0 | Status: SHIPPED | OUTPATIENT
Start: 2024-08-09 | End: 2024-08-19

## 2024-08-09 RX ORDER — IBUPROFEN 400 MG/1
600 TABLET ORAL
Status: COMPLETED | OUTPATIENT
Start: 2024-08-09 | End: 2024-08-09

## 2024-08-09 RX ORDER — IBUPROFEN 600 MG/1
600 TABLET ORAL EVERY 6 HOURS PRN
Qty: 20 TABLET | Refills: 0 | Status: SHIPPED | OUTPATIENT
Start: 2024-08-09

## 2024-08-09 RX ORDER — DEXAMETHASONE SODIUM PHOSPHATE 10 MG/ML
10 INJECTION, SOLUTION INTRAMUSCULAR; INTRAVENOUS
Status: COMPLETED | OUTPATIENT
Start: 2024-08-09 | End: 2024-08-09

## 2024-08-09 RX ORDER — CETIRIZINE HYDROCHLORIDE 10 MG/1
10 TABLET ORAL
Status: COMPLETED | OUTPATIENT
Start: 2024-08-09 | End: 2024-08-09

## 2024-08-09 RX ORDER — FAMOTIDINE 20 MG/1
20 TABLET, FILM COATED ORAL
Status: COMPLETED | OUTPATIENT
Start: 2024-08-09 | End: 2024-08-09

## 2024-08-09 RX ADMIN — CETIRIZINE HYDROCHLORIDE 10 MG: 10 TABLET, FILM COATED ORAL at 11:43

## 2024-08-09 RX ADMIN — DEXAMETHASONE SODIUM PHOSPHATE 10 MG: 10 INJECTION, SOLUTION INTRAMUSCULAR; INTRAVENOUS at 11:43

## 2024-08-09 RX ADMIN — IBUPROFEN 600 MG: 400 TABLET, FILM COATED ORAL at 12:33

## 2024-08-09 RX ADMIN — FAMOTIDINE 20 MG: 20 TABLET, FILM COATED ORAL at 11:43

## 2024-08-09 ASSESSMENT — PAIN DESCRIPTION - DESCRIPTORS: DESCRIPTORS: OTHER (COMMENT)

## 2024-08-09 ASSESSMENT — ENCOUNTER SYMPTOMS
COUGH: 0
SINUS PRESSURE: 1
SHORTNESS OF BREATH: 0
VOMITING: 0
TROUBLE SWALLOWING: 0
BACK PAIN: 0
COLOR CHANGE: 0
NAUSEA: 0
ABDOMINAL PAIN: 0
DIARRHEA: 0

## 2024-08-09 ASSESSMENT — PAIN - FUNCTIONAL ASSESSMENT
PAIN_FUNCTIONAL_ASSESSMENT: 0-10
PAIN_FUNCTIONAL_ASSESSMENT: PREVENTS OR INTERFERES SOME ACTIVE ACTIVITIES AND ADLS

## 2024-08-09 ASSESSMENT — PAIN SCALES - GENERAL: PAINLEVEL_OUTOF10: 8

## 2024-08-09 ASSESSMENT — PAIN DESCRIPTION - ORIENTATION: ORIENTATION: RIGHT

## 2024-08-09 ASSESSMENT — PAIN DESCRIPTION - LOCATION: LOCATION: FACE

## 2024-08-09 NOTE — ED PROVIDER NOTES
[facility] EMERGENCY DEP  EMERGENCY DEPARTMENT ENCOUNTER      Pt Name: Too Fitch  MRN: [id number]  Birthdate 1970  Date of evaluation: 8/9/2024  Provider: CLARE Lr NP    CHIEF COMPLAINT       Chief Complaint   Patient presents with    Facial Swelling         HISTORY OF PRESENT ILLNESS   (Location/Symptom, Timing/Onset, Context/Setting, Quality, Duration, Modifying Factors, Severity)  Note limiting factors.   HPI  Patient is a 54-year-old male with past medical history significant for chronic sinusitis, back pain, dental problems and nicotine use who presents to the ED with right-sided facial tenderness, sore throat, cough and some right-sided dental pain for 2 to 3 days.  He states it feels like he has a sinus infection.Denies fever, headache, neck pain, visual changes, focal weakness or rash. Denies any difficulty breathing, difficulty swallowing, SOB or chest pain. Denies any nausea, vomiting or diarrhea. Pt. Reports that he has not had any medications today prior to arrival.  Old charts reviewed        Review of External Medical Records:     Nursing Notes were reviewed.    REVIEW OF SYSTEMS    (2-9 systems for level 4, 10 or more for level 5)     Review of Systems   Constitutional:  Negative for activity change, appetite change, fever and unexpected weight change.   HENT:  Positive for congestion, dental problem and sinus pressure. Negative for trouble swallowing.    Respiratory:  Negative for cough and shortness of breath.    Cardiovascular:  Negative for chest pain, palpitations and leg swelling.   Gastrointestinal:  Negative for abdominal pain, diarrhea, nausea and vomiting.   Genitourinary:  Negative for dysuria and flank pain.   Musculoskeletal:  Negative for back pain and neck pain.   Skin:  Negative for color change, pallor, rash and wound.   Neurological:  Negative for headaches.   All other systems reviewed and are negative.      Except as noted above the remainder of the review of

## 2024-08-09 NOTE — ED TRIAGE NOTES
Pt c/o right sided facial drainage, +sore throat and cough, some dental pain on right side , feels like a sinus infection

## 2024-08-09 NOTE — DISCHARGE INSTRUCTIONS
Throughout your toothbrush and start fresh with a new toothbrush.  Increase your fluids to 8 ounces every hour that you are awake; include salty liquids such as soups, broths, seltzer water and Gatorade alternating with water.  Take your antibiotic completely as ordered.  Close follow-up if symptoms persist    Emergency Dental Care     [facility]  [address] [place], [place] [address]  Open M, W, F: 8AM - 5PM and T, Th: 8AM-6PM  Phone: [phone], press 4  $70 for Emergency Care  $60 for first routine care, then pay by sliding scale based upon income.    [facility]'s [address] [address]  Phone: [phone]    [facility]  [address] [place] [address]  Open Monday - Friday 8AM - 4:30 PM  Phone: [phone]    [facility]  [facility], [facility], [address] [place] [address], 1st Floor  First Come First Service starting at 8:30 AM M-F  Phone: [phone], press 2  Fee: $150 per tooth (x-ray & extractions only)  Pediatrics Phone:: [phone], 8-5 M-F    [facility]  [facility], [facility], [address] [place] [address], 2nd Floor, [address]  First Come First Service starting at 8:30 AM - 3 PM M - F    [facility]  [address] [place] [address]  Phone: [phone] or [phone]  Emergency Hours: 9:30AM - 11AM (extractions)  Simple tooth extraction $ per tooth. #75 for x-ray    [facility] Residents only, over the age of 18  Phone: [phone]. Leave message saying you need an appointment to register.  Hours: Tuesday Evenings

## 2024-08-11 LAB
BACTERIA SPEC CULT: ABNORMAL
BACTERIA SPEC CULT: ABNORMAL
SERVICE CMNT-IMP: ABNORMAL

## 2024-12-03 ENCOUNTER — HOSPITAL ENCOUNTER (EMERGENCY)
Facility: HOSPITAL | Age: 54
Discharge: HOME | End: 2024-12-03
Attending: EMERGENCY MEDICINE
Payer: MEDICAID

## 2024-12-03 ENCOUNTER — APPOINTMENT (OUTPATIENT)
Facility: HOSPITAL | Age: 54
End: 2024-12-03
Payer: MEDICAID

## 2024-12-03 VITALS
HEART RATE: 80 BPM | WEIGHT: 172.18 LBS | DIASTOLIC BLOOD PRESSURE: 88 MMHG | RESPIRATION RATE: 16 BRPM | SYSTOLIC BLOOD PRESSURE: 127 MMHG | OXYGEN SATURATION: 97 % | BODY MASS INDEX: 25.43 KG/M2 | TEMPERATURE: 97.5 F

## 2024-12-03 DIAGNOSIS — B34.9 VIRAL SYNDROME: Primary | ICD-10-CM

## 2024-12-03 LAB
ALBUMIN SERPL-MCNC: 3.9 G/DL (ref 3.5–5)
ALBUMIN/GLOB SERPL: 1.3 (ref 1.1–2.2)
ALP SERPL-CCNC: 92 U/L (ref 45–117)
ALT SERPL-CCNC: 20 U/L (ref 12–78)
ANION GAP SERPL CALC-SCNC: 3 MMOL/L (ref 2–12)
APPEARANCE UR: CLEAR
AST SERPL-CCNC: 18 U/L (ref 15–37)
BACTERIA URNS QL MICRO: NEGATIVE /HPF
BASOPHILS # BLD: 0 K/UL (ref 0–0.1)
BASOPHILS NFR BLD: 0 % (ref 0–1)
BILIRUB SERPL-MCNC: 0.4 MG/DL (ref 0.2–1)
BILIRUB UR QL: NEGATIVE
BUN SERPL-MCNC: 10 MG/DL (ref 6–20)
BUN/CREAT SERPL: 10 (ref 12–20)
CALCIUM SERPL-MCNC: 8.8 MG/DL (ref 8.5–10.1)
CHLORIDE SERPL-SCNC: 109 MMOL/L (ref 97–108)
CO2 SERPL-SCNC: 28 MMOL/L (ref 21–32)
COLOR UR: ABNORMAL
COMMENT:: NORMAL
CREAT SERPL-MCNC: 1.02 MG/DL (ref 0.7–1.3)
DIFFERENTIAL METHOD BLD: ABNORMAL
EKG ATRIAL RATE: 74 BPM
EKG DIAGNOSIS: NORMAL
EKG P AXIS: 72 DEGREES
EKG P-R INTERVAL: 130 MS
EKG Q-T INTERVAL: 356 MS
EKG QRS DURATION: 82 MS
EKG QTC CALCULATION (BAZETT): 395 MS
EKG R AXIS: 71 DEGREES
EKG T AXIS: 3 DEGREES
EKG VENTRICULAR RATE: 74 BPM
EOSINOPHIL # BLD: 0 K/UL (ref 0–0.4)
EOSINOPHIL NFR BLD: 1 % (ref 0–7)
EPITH CASTS URNS QL MICRO: ABNORMAL /LPF
ERYTHROCYTE [DISTWIDTH] IN BLOOD BY AUTOMATED COUNT: 16.4 % (ref 11.5–14.5)
FLUAV RNA SPEC QL NAA+PROBE: NOT DETECTED
FLUBV RNA SPEC QL NAA+PROBE: NOT DETECTED
GLOBULIN SER CALC-MCNC: 3.1 G/DL (ref 2–4)
GLUCOSE SERPL-MCNC: 114 MG/DL (ref 65–100)
GLUCOSE UR STRIP.AUTO-MCNC: NEGATIVE MG/DL
HCT VFR BLD AUTO: 48.5 % (ref 36.6–50.3)
HGB BLD-MCNC: 15.1 G/DL (ref 12.1–17)
HGB UR QL STRIP: NEGATIVE
HYALINE CASTS URNS QL MICRO: ABNORMAL /LPF (ref 0–5)
IMM GRANULOCYTES # BLD AUTO: 0 K/UL (ref 0–0.04)
IMM GRANULOCYTES NFR BLD AUTO: 0 % (ref 0–0.5)
KETONES UR QL STRIP.AUTO: ABNORMAL MG/DL
LEUKOCYTE ESTERASE UR QL STRIP.AUTO: NEGATIVE
LYMPHOCYTES # BLD: 1.3 K/UL (ref 0.8–3.5)
LYMPHOCYTES NFR BLD: 28 % (ref 12–49)
MCH RBC QN AUTO: 23.1 PG (ref 26–34)
MCHC RBC AUTO-ENTMCNC: 31.1 G/DL (ref 30–36.5)
MCV RBC AUTO: 74.3 FL (ref 80–99)
MONOCYTES # BLD: 0.5 K/UL (ref 0–1)
MONOCYTES NFR BLD: 10 % (ref 5–13)
NEUTS SEG # BLD: 2.8 K/UL (ref 1.8–8)
NEUTS SEG NFR BLD: 61 % (ref 32–75)
NITRITE UR QL STRIP.AUTO: NEGATIVE
NRBC # BLD: 0 K/UL (ref 0–0.01)
NRBC BLD-RTO: 0 PER 100 WBC
PH UR STRIP: 5.5 (ref 5–8)
PLATELET # BLD AUTO: 165 K/UL (ref 150–400)
PMV BLD AUTO: 10.6 FL (ref 8.9–12.9)
POTASSIUM SERPL-SCNC: 3.9 MMOL/L (ref 3.5–5.1)
PROT SERPL-MCNC: 7 G/DL (ref 6.4–8.2)
PROT UR STRIP-MCNC: NEGATIVE MG/DL
RBC # BLD AUTO: 6.53 M/UL (ref 4.1–5.7)
RBC #/AREA URNS HPF: ABNORMAL /HPF (ref 0–5)
SARS-COV-2 RNA RESP QL NAA+PROBE: NOT DETECTED
SODIUM SERPL-SCNC: 140 MMOL/L (ref 136–145)
SOURCE: NORMAL
SP GR UR REFRACTOMETRY: 1.03 (ref 1–1.03)
SPECIMEN HOLD: NORMAL
SPECIMEN HOLD: NORMAL
UROBILINOGEN UR QL STRIP.AUTO: 0.2 EU/DL (ref 0.2–1)
WBC # BLD AUTO: 4.6 K/UL (ref 4.1–11.1)
WBC URNS QL MICRO: ABNORMAL /HPF (ref 0–4)

## 2024-12-03 PROCEDURE — 71045 X-RAY EXAM CHEST 1 VIEW: CPT

## 2024-12-03 PROCEDURE — 93005 ELECTROCARDIOGRAM TRACING: CPT | Performed by: EMERGENCY MEDICINE

## 2024-12-03 PROCEDURE — 87636 SARSCOV2 & INF A&B AMP PRB: CPT

## 2024-12-03 PROCEDURE — 81001 URINALYSIS AUTO W/SCOPE: CPT

## 2024-12-03 PROCEDURE — 99285 EMERGENCY DEPT VISIT HI MDM: CPT

## 2024-12-03 PROCEDURE — 80053 COMPREHEN METABOLIC PANEL: CPT

## 2024-12-03 PROCEDURE — 36415 COLL VENOUS BLD VENIPUNCTURE: CPT

## 2024-12-03 PROCEDURE — 85025 COMPLETE CBC W/AUTO DIFF WBC: CPT

## 2024-12-03 RX ORDER — DULOXETIN HYDROCHLORIDE 30 MG/1
30 CAPSULE, DELAYED RELEASE ORAL DAILY
COMMUNITY

## 2024-12-03 ASSESSMENT — ENCOUNTER SYMPTOMS
COUGH: 0
SINUS PAIN: 0
VOMITING: 0
ABDOMINAL PAIN: 0
SINUS PRESSURE: 0
DIARRHEA: 0
SORE THROAT: 1
RHINORRHEA: 0
NAUSEA: 0
STRIDOR: 0
WHEEZING: 0
TROUBLE SWALLOWING: 0
CHEST TIGHTNESS: 0
BACK PAIN: 0
COLOR CHANGE: 0
BLOOD IN STOOL: 0
EYES NEGATIVE: 1
SHORTNESS OF BREATH: 0

## 2024-12-03 ASSESSMENT — PAIN DESCRIPTION - LOCATION: LOCATION: THROAT

## 2024-12-03 ASSESSMENT — PAIN SCALES - GENERAL: PAINLEVEL_OUTOF10: 9

## 2024-12-03 ASSESSMENT — PAIN DESCRIPTION - DESCRIPTORS: DESCRIPTORS: OTHER (COMMENT)

## 2024-12-03 ASSESSMENT — PAIN DESCRIPTION - ORIENTATION: ORIENTATION: OTHER (COMMENT)

## 2024-12-03 NOTE — ED NOTES
Patient given copy of dc instructions and script(s).  Patient verbalized understanding of instructions and script (s).  Patient given a current medication reconciliation form and verbalized understanding of their medications.   Patient verbalized understanding of the importance of discussing medications with  his or her physician or clinic they will be following up with.  Patient alert and oriented and in no acute distress.  Patient discharged home ambulatory with steady gait.

## 2024-12-03 NOTE — ED TRIAGE NOTES
Pt started not feeling well yesterday, +body aches, +abd pain, burns to urinate, +weak, +sore throat, +cough, +chest pain , pt also requesting iron pills  he was told it was low

## 2024-12-03 NOTE — DISCHARGE INSTRUCTIONS
All of your test are negative but it is likely that this may be a viral syndrome with your symptoms.  Use over-the-counter medicines like ibuprofen, plenty of fluids and Robitussin DM if needed.  Rest and follow-up with your own physician if continued difficulty.  A work note is given for 2 days.

## 2025-02-01 ENCOUNTER — APPOINTMENT (OUTPATIENT)
Facility: HOSPITAL | Age: 55
End: 2025-02-01
Payer: MEDICAID

## 2025-02-01 ENCOUNTER — HOSPITAL ENCOUNTER (EMERGENCY)
Facility: HOSPITAL | Age: 55
Discharge: HOME | End: 2025-02-01
Attending: EMERGENCY MEDICINE
Payer: MEDICAID

## 2025-02-01 VITALS
SYSTOLIC BLOOD PRESSURE: 123 MMHG | OXYGEN SATURATION: 97 % | TEMPERATURE: 98.4 F | HEART RATE: 79 BPM | WEIGHT: 187.5 LBS | DIASTOLIC BLOOD PRESSURE: 78 MMHG | BODY MASS INDEX: 27.77 KG/M2 | HEIGHT: 69 IN | RESPIRATION RATE: 19 BRPM

## 2025-02-01 DIAGNOSIS — R53.83 MALAISE AND FATIGUE: Primary | ICD-10-CM

## 2025-02-01 DIAGNOSIS — R53.81 MALAISE AND FATIGUE: Primary | ICD-10-CM

## 2025-02-01 LAB
ALBUMIN SERPL-MCNC: 3.8 G/DL (ref 3.5–5)
ALBUMIN/GLOB SERPL: 1.2 (ref 1.1–2.2)
ALP SERPL-CCNC: 81 U/L (ref 45–117)
ALT SERPL-CCNC: 28 U/L (ref 12–78)
ANION GAP SERPL CALC-SCNC: 12 MMOL/L (ref 2–12)
AST SERPL-CCNC: 12 U/L (ref 15–37)
BASOPHILS # BLD: 0.03 K/UL (ref 0–0.1)
BASOPHILS NFR BLD: 0.5 % (ref 0–1)
BILIRUB SERPL-MCNC: 0.5 MG/DL (ref 0.2–1)
BUN SERPL-MCNC: 15 MG/DL (ref 6–20)
BUN/CREAT SERPL: 13 (ref 12–20)
CALCIUM SERPL-MCNC: 8.7 MG/DL (ref 8.5–10.1)
CHLORIDE SERPL-SCNC: 104 MMOL/L (ref 97–108)
CO2 SERPL-SCNC: 27 MMOL/L (ref 21–32)
CREAT SERPL-MCNC: 1.14 MG/DL (ref 0.7–1.3)
DIFFERENTIAL METHOD BLD: ABNORMAL
EKG ATRIAL RATE: 77 BPM
EKG DIAGNOSIS: NORMAL
EKG P AXIS: 76 DEGREES
EKG P-R INTERVAL: 140 MS
EKG Q-T INTERVAL: 350 MS
EKG QRS DURATION: 84 MS
EKG QTC CALCULATION (BAZETT): 396 MS
EKG R AXIS: 57 DEGREES
EKG T AXIS: 35 DEGREES
EKG VENTRICULAR RATE: 77 BPM
EOSINOPHIL # BLD: 0.09 K/UL (ref 0–0.4)
EOSINOPHIL NFR BLD: 1.6 % (ref 0–7)
ERYTHROCYTE [DISTWIDTH] IN BLOOD BY AUTOMATED COUNT: 17.1 % (ref 11.5–14.5)
FLUAV RNA SPEC QL NAA+PROBE: NOT DETECTED
FLUBV RNA SPEC QL NAA+PROBE: NOT DETECTED
GLOBULIN SER CALC-MCNC: 3.1 G/DL (ref 2–4)
GLUCOSE SERPL-MCNC: 99 MG/DL (ref 65–100)
HCT VFR BLD AUTO: 45.6 % (ref 36.6–50.3)
HGB BLD-MCNC: 14.2 G/DL (ref 12.1–17)
IMM GRANULOCYTES # BLD AUTO: 0.02 K/UL (ref 0–0.04)
IMM GRANULOCYTES NFR BLD AUTO: 0.3 % (ref 0–0.5)
LYMPHOCYTES # BLD: 1.99 K/UL (ref 0.8–3.5)
LYMPHOCYTES NFR BLD: 34.6 % (ref 12–49)
MCH RBC QN AUTO: 23.1 PG (ref 26–34)
MCHC RBC AUTO-ENTMCNC: 31.1 G/DL (ref 30–36.5)
MCV RBC AUTO: 74.1 FL (ref 80–99)
MONOCYTES # BLD: 0.59 K/UL (ref 0–1)
MONOCYTES NFR BLD: 10.3 % (ref 5–13)
NEUTS SEG # BLD: 3.03 K/UL (ref 1.8–8)
NEUTS SEG NFR BLD: 52.7 % (ref 32–75)
NRBC # BLD: 0 K/UL (ref 0–0.01)
NRBC BLD-RTO: 0 PER 100 WBC
NT PRO BNP: 22 PG/ML (ref 0–125)
PLATELET # BLD AUTO: 180 K/UL (ref 150–400)
PMV BLD AUTO: 11.1 FL (ref 8.9–12.9)
POTASSIUM SERPL-SCNC: 3.7 MMOL/L (ref 3.5–5.1)
PROT SERPL-MCNC: 6.9 G/DL (ref 6.4–8.2)
RBC # BLD AUTO: 6.15 M/UL (ref 4.1–5.7)
SARS-COV-2 RNA RESP QL NAA+PROBE: NOT DETECTED
SODIUM SERPL-SCNC: 143 MMOL/L (ref 136–145)
SOURCE: NORMAL
TROPONIN I SERPL HS-MCNC: 4 NG/L (ref 0–76)
WBC # BLD AUTO: 5.8 K/UL (ref 4.1–11.1)

## 2025-02-01 PROCEDURE — 84484 ASSAY OF TROPONIN QUANT: CPT

## 2025-02-01 PROCEDURE — 99285 EMERGENCY DEPT VISIT HI MDM: CPT

## 2025-02-01 PROCEDURE — 83880 ASSAY OF NATRIURETIC PEPTIDE: CPT

## 2025-02-01 PROCEDURE — 36415 COLL VENOUS BLD VENIPUNCTURE: CPT

## 2025-02-01 PROCEDURE — 93005 ELECTROCARDIOGRAM TRACING: CPT

## 2025-02-01 PROCEDURE — 87636 SARSCOV2 & INF A&B AMP PRB: CPT

## 2025-02-01 PROCEDURE — 71045 X-RAY EXAM CHEST 1 VIEW: CPT

## 2025-02-01 PROCEDURE — 85025 COMPLETE CBC W/AUTO DIFF WBC: CPT

## 2025-02-01 PROCEDURE — 80053 COMPREHEN METABOLIC PANEL: CPT

## 2025-02-01 ASSESSMENT — PAIN - FUNCTIONAL ASSESSMENT: PAIN_FUNCTIONAL_ASSESSMENT: NONE - DENIES PAIN

## 2025-02-01 NOTE — ED PROVIDER NOTES
[facility] EMERGENCY DEPARTMENT  EMERGENCY DEPARTMENT ENCOUNTER       Pt Name: Too Fitch  MRN: [id number]  Birthdate 1970  Date of evaluation: 2/1/2025  Provider: Geraldine Beauchamp MD   PCP: No primary care provider on file.  Note Started: 4:37 AM EST 2/1/25     CHIEF COMPLAINT       Chief Complaint   Patient presents with    Shortness of Breath     Xfew months        HISTORY OF PRESENT ILLNESS: 1 or more elements      History From: ***, History limited by: ***none     Too Fitch is a 54 y.o. male ***       Please See MDM for Additional Details of the HPI/PMH  Nursing Notes were all reviewed and agreed with or any disagreements were addressed in the HPI.     REVIEW OF SYSTEMS        Positives and Pertinent negatives as per HPI.    PAST HISTORY     Past Medical History:  Past Medical History:   Diagnosis Date    Back pain        Past Surgical History:  Past Surgical History:   Procedure Laterality Date    OTHER SURGICAL HISTORY      Nasal bridge fracture       Family History:  Family History   Problem Relation Age of Onset    Diabetes Mother        Social History:  Social History     Tobacco Use    Smoking status: Every Day     Current packs/day: 0.50     Types: Cigarettes    Smokeless tobacco: Never   Substance Use Topics    Alcohol use: Not Currently    Drug use: No       Allergies:  Allergies   Allergen Reactions    Acetaminophen Nausea Only    Bee Venom Swelling    Tramadol Other (See Comments)     Stomach pain.       CURRENT MEDICATIONS      Previous Medications    ALBUTEROL SULFATE HFA (PROVENTIL;VENTOLIN;PROAIR) 108 (90 BASE) MCG/ACT INHALER    Inhale 1 puff into the lungs every 4 hours as needed    CYCLOBENZAPRINE (FLEXERIL) 5 MG TABLET    Take by mouth 2 times daily    DULOXETINE (CYMBALTA) 30 MG EXTENDED RELEASE CAPSULE    Take 1 capsule by mouth daily    FLUTICASONE (FLONASE) 50 MCG/ACT NASAL SPRAY    2 sprays by Nasal route daily    IBUPROFEN (IBU) 600 MG TABLET    Take 1 tablet by

## 2025-02-01 NOTE — DISCHARGE INSTRUCTIONS
Thank You!    It was a pleasure taking care of you in our Emergency Department today. We know that when you come to our Emergency Department, you are entrusting us with your health, comfort, and safety. Our physicians and nurses honor that trust, and truly appreciate the opportunity to care for you and your loved ones.      We also value your feedback. If you receive a survey about your Emergency Department experience today, please fill it out.  We care about our patients' feedback, and we listen to what you have to say.  Thank you.    Geraldine Beauchamp MD  ________________________________________________________________________  I have included a copy of your lab results and/or radiologic studies from today's visit so you can have them easily available at your follow-up visit. We hope you feel better and please do not hesitate to contact the ED if you have any questions at all!    Recent Results (from the past 12 hour(s))   EKG 12 Lead    Collection Time: 02/01/25  3:47 AM   Result Value Ref Range    Ventricular Rate 77 BPM    Atrial Rate 77 BPM    P-R Interval 140 ms    QRS Duration 84 ms    Q-T Interval 350 ms    QTc Calculation (Bazett) 396 ms    P Axis 76 degrees    R Axis 57 degrees    T Axis 35 degrees    Diagnosis       Normal sinus rhythm  Normal ECG  When compared with ECG of 03-DEC-2024 10:53,  Nonspecific T wave abnormality has replaced inverted T waves in Inferior   leads     Troponin    Collection Time: 02/01/25  3:48 AM   Result Value Ref Range    Troponin, High Sensitivity 4 0 - 76 ng/L   CBC with Auto Differential    Collection Time: 02/01/25  3:48 AM   Result Value Ref Range    WBC 5.8 4.1 - 11.1 K/uL    RBC 6.15 (H) 4.10 - 5.70 M/uL    Hemoglobin 14.2 12.1 - 17.0 g/dL    Hematocrit 45.6 36.6 - 50.3 %    MCV 74.1 (L) 80.0 - 99.0 FL    MCH 23.1 (L) 26.0 - 34.0 PG    MCHC 31.1 30.0 - 36.5 g/dL    RDW 17.1 (H) 11.5 - 14.5 %    Platelets 180 150 - 400 K/uL    MPV 11.1 8.9 - 12.9 FL    Nucleated RBCs

## 2025-02-01 NOTE — ED TRIAGE NOTES
Pt presents to ED with CC SOB and fatigue for a few months  Pt reports seen by PCP middle of Jan, iron low and given med to take once weekly  Pt reports SOB and fatigue have not been improving  Pt states PCP cannot see him for 6 months

## 2025-02-03 LAB
EKG ATRIAL RATE: 77 BPM
EKG DIAGNOSIS: NORMAL
EKG P AXIS: 76 DEGREES
EKG P-R INTERVAL: 140 MS
EKG Q-T INTERVAL: 350 MS
EKG QRS DURATION: 84 MS
EKG QTC CALCULATION (BAZETT): 396 MS
EKG R AXIS: 57 DEGREES
EKG T AXIS: 35 DEGREES
EKG VENTRICULAR RATE: 77 BPM

## 2025-03-10 ENCOUNTER — HOSPITAL ENCOUNTER (EMERGENCY)
Facility: HOSPITAL | Age: 55
Discharge: HOME | End: 2025-03-10
Payer: MEDICAID

## 2025-03-10 VITALS
WEIGHT: 187 LBS | RESPIRATION RATE: 18 BRPM | HEART RATE: 86 BPM | SYSTOLIC BLOOD PRESSURE: 131 MMHG | HEIGHT: 69 IN | BODY MASS INDEX: 27.7 KG/M2 | DIASTOLIC BLOOD PRESSURE: 80 MMHG | TEMPERATURE: 97.9 F | OXYGEN SATURATION: 97 %

## 2025-03-10 DIAGNOSIS — S39.012A STRAIN OF LUMBAR REGION, INITIAL ENCOUNTER: Primary | ICD-10-CM

## 2025-03-10 PROCEDURE — 99283 EMERGENCY DEPT VISIT LOW MDM: CPT

## 2025-03-10 RX ORDER — METHOCARBAMOL 750 MG/1
750 TABLET, FILM COATED ORAL EVERY 6 HOURS PRN
Qty: 20 TABLET | Refills: 0 | Status: SHIPPED | OUTPATIENT
Start: 2025-03-10 | End: 2025-03-20

## 2025-03-10 RX ORDER — LIDOCAINE 50 MG/G
1 PATCH TOPICAL DAILY
Qty: 10 PATCH | Refills: 0 | Status: SHIPPED | OUTPATIENT
Start: 2025-03-10 | End: 2025-03-20

## 2025-03-10 RX ORDER — PREDNISONE 10 MG/1
TABLET ORAL
Qty: 21 EACH | Refills: 0 | Status: SHIPPED | OUTPATIENT
Start: 2025-03-10

## 2025-03-10 ASSESSMENT — PAIN - FUNCTIONAL ASSESSMENT: PAIN_FUNCTIONAL_ASSESSMENT: 0-10

## 2025-03-10 ASSESSMENT — PAIN DESCRIPTION - LOCATION: LOCATION: BACK

## 2025-03-10 ASSESSMENT — PAIN DESCRIPTION - DESCRIPTORS: DESCRIPTORS: ACHING;SORE

## 2025-03-10 ASSESSMENT — LIFESTYLE VARIABLES
HOW OFTEN DO YOU HAVE A DRINK CONTAINING ALCOHOL: NEVER
HOW MANY STANDARD DRINKS CONTAINING ALCOHOL DO YOU HAVE ON A TYPICAL DAY: PATIENT DOES NOT DRINK

## 2025-03-10 ASSESSMENT — PAIN DESCRIPTION - ORIENTATION: ORIENTATION: LOWER

## 2025-03-10 ASSESSMENT — PAIN SCALES - GENERAL: PAINLEVEL_OUTOF10: 10

## 2025-03-11 NOTE — ED PROVIDER NOTES
paraspinal area.  No midline tenderness, no deformity, step deformity.  Lower extremity strength is 5-5, equal bilaterally.  Intact gait without assistance.   Skin:     General: Skin is warm and dry.   Neurological:      General: No focal deficit present.      Mental Status: He is alert and oriented to person, place, and time.   Psychiatric:         Mood and Affect: Mood normal.         Behavior: Behavior normal.          DIAGNOSTIC RESULTS   LABS:     No results found for this or any previous visit (from the past 24 hours).      EKG: When ordered, EKG's are interpreted by the Emergency Department Physician in the absence of a cardiologist.  Please see their note for interpretation of EKG.      RADIOLOGY:  Non-plain film images such as CT, Ultrasound and MRI are read by the radiologist. Plain radiographic images are visualized and preliminarily interpreted by the ED Provider with the below findings:     none     Interpretation per the Radiologist below, if available at the time of this note:     No results found.     PROCEDURES   Unless otherwise noted below, none  Procedures     CRITICAL CARE TIME   Patient does not meet Critical Care Time, 0 minutes     EMERGENCY DEPARTMENT COURSE and DIFFERENTIAL DIAGNOSIS/MDM   Vitals:    Vitals:    03/10/25 1957   BP: 131/80   Pulse: 86   Resp: 18   Temp: 97.9 °F (36.6 °C)   TempSrc: Oral   SpO2: 97%   Weight: 84.8 kg (187 lb)   Height: 1.753 m (5' 9\")        Patient was given the following medications:  Medications - No data to display    CONSULTS: (Who and What was discussed)  None    Chronic Conditions:  has a past medical history of Back pain.     Social Determinants affecting Dx or Tx: None    Records Reviewed (source and summary of external records): Nursing Notes and Old Medical Records    CC/HPI Summary, DDx, ED Course, and Reassessment: The patient presents with acute low back pain. Afebrile with stable vitals; very well-appearing with benign exam. No concerning red

## 2025-03-11 NOTE — ED NOTES
Pt reports to ED w/ complaints of lower back pain x Saturday after helping his niece move.  Pt states has taken OTC meds PTA w/ no relief.  Pt reports HX of \"bad back\"  Pt ambulatory into ED and bed.    Emergency Department Nursing Plan of Care      The Nursing Plan of Care is developed from the Nursing assessment and Emergency Department Attending provider initial evaluation.  The plan of care may be reviewed in the “ED Provider note”.    The Plan of Care was developed with the following considerations:  Patient / Family readiness to learn indicated by:verbalized understanding  Persons(s) to be included in education: patient  Barriers to Learning/Limitations:None    Signed    Gustavo Mc, RN    3/10/2025   8:59 PM

## 2025-03-11 NOTE — ED NOTES
Discharge instructions were given to the patient by MARLA Ruiz.     The patient left the Emergency Department alert and oriented and in no acute distress with 3 prescriptions. The patient was encouraged to call or return to the ED for worsening issues or problems and was encouraged to schedule a follow up appointment for continuing care.     Ambulation assessment completed before discharge.  Pt left Emergency Department ambulating at baseline with no ortho devices  Ortho device education: none    The patient verbalized understanding of discharge instructions and prescriptions, all questions were answered. The patient has no further concerns at this time.

## 2025-04-03 ENCOUNTER — APPOINTMENT (OUTPATIENT)
Facility: HOSPITAL | Age: 55
End: 2025-04-03
Payer: MEDICAID

## 2025-04-03 ENCOUNTER — HOSPITAL ENCOUNTER (EMERGENCY)
Facility: HOSPITAL | Age: 55
Discharge: HOME | End: 2025-04-03
Payer: MEDICAID

## 2025-04-03 VITALS
BODY MASS INDEX: 27.1 KG/M2 | HEIGHT: 69 IN | DIASTOLIC BLOOD PRESSURE: 77 MMHG | TEMPERATURE: 97.5 F | HEART RATE: 71 BPM | SYSTOLIC BLOOD PRESSURE: 111 MMHG | WEIGHT: 182.98 LBS | RESPIRATION RATE: 18 BRPM | OXYGEN SATURATION: 95 %

## 2025-04-03 DIAGNOSIS — J06.9 VIRAL URI WITH COUGH: Primary | ICD-10-CM

## 2025-04-03 LAB
FLUAV RNA SPEC QL NAA+PROBE: NOT DETECTED
FLUBV RNA SPEC QL NAA+PROBE: NOT DETECTED
S PYO DNA THROAT QL NAA+PROBE: NOT DETECTED
SARS-COV-2 RNA RESP QL NAA+PROBE: NOT DETECTED
SOURCE: NORMAL

## 2025-04-03 PROCEDURE — 87651 STREP A DNA AMP PROBE: CPT

## 2025-04-03 PROCEDURE — 71046 X-RAY EXAM CHEST 2 VIEWS: CPT

## 2025-04-03 PROCEDURE — 87636 SARSCOV2 & INF A&B AMP PRB: CPT

## 2025-04-03 PROCEDURE — 6370000000 HC RX 637 (ALT 250 FOR IP)

## 2025-04-03 PROCEDURE — 99284 EMERGENCY DEPT VISIT MOD MDM: CPT

## 2025-04-03 RX ORDER — FLUTICASONE PROPIONATE 50 MCG
2 SPRAY, SUSPENSION (ML) NASAL DAILY
Qty: 16 G | Refills: 0 | Status: SHIPPED | OUTPATIENT
Start: 2025-04-03

## 2025-04-03 RX ORDER — ACETAMINOPHEN 500 MG
1000 TABLET ORAL EVERY 6 HOURS PRN
Qty: 30 TABLET | Refills: 0 | Status: SHIPPED | OUTPATIENT
Start: 2025-04-03

## 2025-04-03 RX ORDER — ACETAMINOPHEN 500 MG
1000 TABLET ORAL
Status: COMPLETED | OUTPATIENT
Start: 2025-04-03 | End: 2025-04-03

## 2025-04-03 RX ORDER — ALBUTEROL SULFATE 90 UG/1
1 INHALANT RESPIRATORY (INHALATION) EVERY 4 HOURS PRN
Qty: 18 G | Refills: 0 | Status: SHIPPED | OUTPATIENT
Start: 2025-04-03

## 2025-04-03 RX ORDER — LORATADINE 10 MG/1
10 TABLET ORAL DAILY
Qty: 30 TABLET | Refills: 0 | Status: SHIPPED | OUTPATIENT
Start: 2025-04-03

## 2025-04-03 RX ORDER — BENZONATATE 200 MG/1
200 CAPSULE ORAL 3 TIMES DAILY PRN
Qty: 30 CAPSULE | Refills: 0 | Status: SHIPPED | OUTPATIENT
Start: 2025-04-03 | End: 2025-04-13

## 2025-04-03 RX ADMIN — ACETAMINOPHEN 1000 MG: 500 TABLET ORAL at 08:35

## 2025-04-03 ASSESSMENT — PAIN DESCRIPTION - PAIN TYPE: TYPE: ACUTE PAIN

## 2025-04-03 ASSESSMENT — PAIN SCALES - GENERAL: PAINLEVEL_OUTOF10: 10

## 2025-04-03 ASSESSMENT — PAIN DESCRIPTION - DESCRIPTORS: DESCRIPTORS: ACHING

## 2025-04-03 ASSESSMENT — PAIN - FUNCTIONAL ASSESSMENT: PAIN_FUNCTIONAL_ASSESSMENT: 0-10

## 2025-04-03 ASSESSMENT — PAIN DESCRIPTION - LOCATION: LOCATION: GENERALIZED

## 2025-04-03 NOTE — ED NOTES
Pt presents to ED ambulatory complaining of cold symptoms that occurred x yesterday. Pt symptoms include nasal drainage, cough, and body aches. Pt reports using Advil, Nyquil, and Dayquil, some relief.  Pt is alert and oriented x 4, RR even and unlabored, skin is warm and dry. Assessment completed and pt updated on plan of care.  Call bell in reach.        Emergency Department Nursing Plan of Care       The Nursing Plan of Care is developed from the Nursing assessment and Emergency Department Attending provider initial evaluation.  The plan of care may be reviewed in the “ED Provider note”.    The Plan of Care was developed with the following considerations:   Patient / Family readiness to learn indicated by:verbalized understanding  Persons(s) to be included in education: patient  Barriers to Learning/Limitations:None    Signed

## 2025-04-03 NOTE — ED TRIAGE NOTES
Pt arrives at the ED with complaints of cold symptoms that include body aches, chills coughing, and runny nose since Tuesday. Pt states he took OTC meds yesterday and got no relief.

## 2025-04-03 NOTE — ED PROVIDER NOTES
[facility] EMERGENCY DEPARTMENT  EMERGENCY DEPARTMENT ENCOUNTER       Pt Name: Too Fitch  MRN: [id number]  Birthdate 1970  Date of evaluation: 4/3/2025  Provider: Vandana Ruiz PA-C   PCP: No primary care provider on file.  Note Started: 8:29 AM EDT 4/3/25     CHIEF COMPLAINT       Chief Complaint   Patient presents with    Cold Symptoms        HISTORY OF PRESENT ILLNESS: 1 or more elements      History From: Patient  HPI Limitations: None     Too Fitch is a 55 y.o. male with asthma history allergies who presents complaining of nasal congestion, cough, sore throat myalgias, sweats, chills x 1 day.  Patient reports that symptom started yesterday, progressively worsening, reports that cough is keeping him up at night.  Patient reports feeling worse today so came to the ED for evaluation.  Patient reports taking DayQuil, cough, Tylenol, Advil yesterday with some mild relief.  Patient reports that he has a cough with yellowish-greenish phlegm.  Patient denies any nausea, vomiting, abdominal pain, bowel/bladder changes.  Patient denies any chest pain or shortness of breath.     Nursing Notes were all reviewed and agreed with or any disagreements were addressed in the HPI.     REVIEW OF SYSTEMS      Review of Systems     Positives and Pertinent negatives as per HPI.    PAST HISTORY     Past Medical History:  Past Medical History:   Diagnosis Date    Back pain        Past Surgical History:  Past Surgical History:   Procedure Laterality Date    OTHER SURGICAL HISTORY      Nasal bridge fracture       Family History:  Family History   Problem Relation Age of Onset    Diabetes Mother        Social History:  Social History     Tobacco Use    Smoking status: Every Day     Current packs/day: 0.50     Types: Cigarettes    Smokeless tobacco: Never   Substance Use Topics    Alcohol use: Not Currently    Drug use: No       Allergies:  Allergies   Allergen Reactions    Acetaminophen Nausea Only    Bee Venom

## 2025-04-25 ENCOUNTER — ANESTHESIA EVENT (OUTPATIENT)
Facility: HOSPITAL | Age: 55
End: 2025-04-25
Payer: MEDICAID

## 2025-04-25 NOTE — ANESTHESIA PRE PROCEDURE
Department of Anesthesiology  Preprocedure Note       Name:  Too Fitch   Age:  55 y.o.  :  1970                                          MRN:  [id number]         Date:  2025      Surgeon: Surgeon(s):  Ricardo Ferrari MD    Procedure: Procedure(s):  ESOPHAGOGASTRODUODENOSCOPY    Medications prior to admission:   Prior to Admission medications    Medication Sig Start Date End Date Taking? Authorizing Provider   albuterol sulfate HFA (PROVENTIL;VENTOLIN;PROAIR) 108 (90 Base) MCG/ACT inhaler Inhale 1 puff into the lungs every 4 hours as needed for Wheezing or Shortness of Breath 4/3/25   Vandana Ruiz PA-C   loratadine (CLARITIN) 10 MG tablet Take 1 tablet by mouth daily 4/3/25   Vandana Ruiz PA-C   fluticasone (FLONASE) 50 MCG/ACT nasal spray 2 sprays by Nasal route daily 4/3/25   Vandana Ruiz PA-C   acetaminophen (TYLENOL) 500 MG tablet Take 2 tablets by mouth every 6 hours as needed for Pain 4/3/25   Vandana Ruiz PA-C   predniSONE 10 MG (21) TBPK Take dosepack as instructed  Patient not taking: Reported on 4/3/2025 3/10/25   Vandana Ruiz PA-C   DULoxetine (CYMBALTA) 30 MG extended release capsule Take 1 capsule by mouth daily  Patient not taking: Reported on 4/3/2025    Provider, MD Griselda   ibuprofen (IBU) 600 MG tablet Take 1 tablet by mouth every 6 hours as needed for Pain  Patient not taking: Reported on 4/3/2025 8/9/24   Carolina Rodriguez APRN - NP   naproxen (NAPROSYN) 500 MG tablet Take 1 tablet by mouth 2 times daily for 30 doses 9/18/23 10/3/23  Jenise Spencer APRN - NP   cyclobenzaprine (FLEXERIL) 5 MG tablet Take by mouth 2 times daily  Patient not taking: Reported on 4/3/2025 1/11/19   Automatic Reconciliation, Ar   ketorolac (TORADOL) 10 MG tablet Take by mouth 3 times daily as needed  Patient not taking: Reported on 4/3/2025 1/30/23   Automatic Reconciliation, Ar   ondansetron (ZOFRAN-ODT) 4 MG disintegrating tablet Place under the tongue every 8 hours as

## 2025-04-28 ENCOUNTER — HOSPITAL ENCOUNTER (OUTPATIENT)
Facility: HOSPITAL | Age: 55
Setting detail: OUTPATIENT SURGERY
Discharge: HOME | End: 2025-04-28
Attending: INTERNAL MEDICINE | Admitting: INTERNAL MEDICINE
Payer: MEDICAID

## 2025-04-28 ENCOUNTER — ANESTHESIA (OUTPATIENT)
Facility: HOSPITAL | Age: 55
End: 2025-04-28
Payer: MEDICAID

## 2025-04-28 VITALS
TEMPERATURE: 98.3 F | HEIGHT: 69 IN | SYSTOLIC BLOOD PRESSURE: 114 MMHG | OXYGEN SATURATION: 99 % | BODY MASS INDEX: 27.4 KG/M2 | DIASTOLIC BLOOD PRESSURE: 89 MMHG | WEIGHT: 185 LBS | RESPIRATION RATE: 19 BRPM | HEART RATE: 81 BPM

## 2025-04-28 PROCEDURE — 3600007502: Performed by: INTERNAL MEDICINE

## 2025-04-28 PROCEDURE — 2709999900 HC NON-CHARGEABLE SUPPLY: Performed by: INTERNAL MEDICINE

## 2025-04-28 PROCEDURE — 88305 TISSUE EXAM BY PATHOLOGIST: CPT

## 2025-04-28 PROCEDURE — 3700000000 HC ANESTHESIA ATTENDED CARE: Performed by: INTERNAL MEDICINE

## 2025-04-28 PROCEDURE — 2580000003 HC RX 258: Performed by: INTERNAL MEDICINE

## 2025-04-28 PROCEDURE — 6360000002 HC RX W HCPCS: Performed by: NURSE ANESTHETIST, CERTIFIED REGISTERED

## 2025-04-28 PROCEDURE — 7100000010 HC PHASE II RECOVERY - FIRST 15 MIN: Performed by: INTERNAL MEDICINE

## 2025-04-28 PROCEDURE — 7100000011 HC PHASE II RECOVERY - ADDTL 15 MIN: Performed by: INTERNAL MEDICINE

## 2025-04-28 PROCEDURE — 2720000010 HC SURG SUPPLY STERILE: Performed by: INTERNAL MEDICINE

## 2025-04-28 RX ORDER — SODIUM CHLORIDE 9 MG/ML
INJECTION, SOLUTION INTRAVENOUS PRN
Status: DISCONTINUED | OUTPATIENT
Start: 2025-04-28 | End: 2025-04-28 | Stop reason: HOSPADM

## 2025-04-28 RX ORDER — SODIUM CHLORIDE 0.9 % (FLUSH) 0.9 %
5-40 SYRINGE (ML) INJECTION EVERY 12 HOURS SCHEDULED
Status: DISCONTINUED | OUTPATIENT
Start: 2025-04-28 | End: 2025-04-28 | Stop reason: HOSPADM

## 2025-04-28 RX ORDER — CLONIDINE HYDROCHLORIDE 0.1 MG/1
TABLET ORAL
COMMUNITY
Start: 2025-03-16

## 2025-04-28 RX ORDER — FAMOTIDINE 40 MG/1
TABLET, FILM COATED ORAL
COMMUNITY
Start: 2025-04-15

## 2025-04-28 RX ORDER — SODIUM CHLORIDE 9 MG/ML
INJECTION, SOLUTION INTRAVENOUS CONTINUOUS
Status: DISCONTINUED | OUTPATIENT
Start: 2025-04-28 | End: 2025-04-28 | Stop reason: HOSPADM

## 2025-04-28 RX ORDER — GLYCOPYRROLATE 0.2 MG/ML
INJECTION INTRAMUSCULAR; INTRAVENOUS
Status: DISCONTINUED | OUTPATIENT
Start: 2025-04-28 | End: 2025-04-28 | Stop reason: SDUPTHER

## 2025-04-28 RX ORDER — ATOGEPANT 60 MG/1
TABLET ORAL
COMMUNITY
Start: 2025-04-15

## 2025-04-28 RX ORDER — UBROGEPANT 50 MG/1
TABLET ORAL
COMMUNITY

## 2025-04-28 RX ORDER — SODIUM CHLORIDE 0.9 % (FLUSH) 0.9 %
5-40 SYRINGE (ML) INJECTION PRN
Status: DISCONTINUED | OUTPATIENT
Start: 2025-04-28 | End: 2025-04-28 | Stop reason: HOSPADM

## 2025-04-28 RX ORDER — LIDOCAINE HYDROCHLORIDE 20 MG/ML
INJECTION, SOLUTION EPIDURAL; INFILTRATION; INTRACAUDAL; PERINEURAL
Status: DISCONTINUED | OUTPATIENT
Start: 2025-04-28 | End: 2025-04-28 | Stop reason: SDUPTHER

## 2025-04-28 RX ADMIN — GLYCOPYRROLATE 0.2 MG: 0.2 INJECTION INTRAMUSCULAR; INTRAVENOUS at 10:46

## 2025-04-28 RX ADMIN — PROPOFOL 50 MG: 10 INJECTION, EMULSION INTRAVENOUS at 10:54

## 2025-04-28 RX ADMIN — PROPOFOL 50 MG: 10 INJECTION, EMULSION INTRAVENOUS at 10:53

## 2025-04-28 RX ADMIN — SODIUM CHLORIDE: 9 INJECTION, SOLUTION INTRAVENOUS at 10:46

## 2025-04-28 RX ADMIN — LIDOCAINE HYDROCHLORIDE 100 MG: 20 INJECTION, SOLUTION EPIDURAL; INFILTRATION; INTRACAUDAL; PERINEURAL at 10:51

## 2025-04-28 RX ADMIN — PROPOFOL 30 MG: 10 INJECTION, EMULSION INTRAVENOUS at 10:55

## 2025-04-28 RX ADMIN — PROPOFOL 125 MG: 10 INJECTION, EMULSION INTRAVENOUS at 10:51

## 2025-04-28 NOTE — DISCHARGE INSTRUCTIONS
[facility]  [facility]  CHERISE Olmos MD  [phone]      April 28, 2025     Too Fitch  YOB: 1970    ENDOSCOPY DISCHARGE INSTRUCTIONS    If there is redness at IV site you should apply warm compress to area.  If redness or soreness persist contact Dr. Olmos or your primary care doctor.    Gaseous discomfort may develop, but walking, belching will help relieve this.  You may not operate a vehicle for 12 hours  You may not operate machinery or dangerous appliances for rest of today  You may not drink alcoholic beverages for 12 hours  Avoid making any critical decisions for 24 hours    DIET:  You may resume your normal diet, but some patients find that heavy or large meals may lead to indigestion or vomiting.  I suggest a light meal as first food intake.    MEDICATIONS:  The use of some over-the-counter pain medication may lead to bleeding after biopsies or other procedures you may have had done.  Tylenol (also called acetaminophen) is safe to take and will not lead to bleeding.  Based on the procedure you had today you may safely take aspirin or aspirin-like products for the next seven (7) days.      ACTIVITY:  You may resume your normal household activities, but it is recommended that you spend the remainder of the day resting -  avoid any strenuous activity.     CALL DR. OLMOS'S OFFICE IF:  Increasing pain, nausea, vomiting  Abdominal distension (swelling)  Significant new or increased bleeding (oral or rectal)  Fever/Chills  Chest pain/shortness of breath                   Additional instructions:   Impression: LA class C erosive esophagitis. Possible short segment Faustin's esophagus with biopsies taken. Mild narrowing not dilated given burden of erosive disease. Small hiatal hernia. Otherwise normal stomach with random biopsies taken. Normal duodenum.            Recommendations:  Await

## 2025-04-28 NOTE — OP NOTE
[facility]  formerly [facility]  CHERISE Ferrari Jr, MD  [phone]      2025    Esophagogastroduodenoscopy (EGD) Procedure Note  Too Fitch  : 1970  [facility] Medical Record Number: [id number]      Indications:   GERD, dysphagia  Referring Physician:  No primary care provider on file.  Anesthesia/Sedation: See Anesthesia Record  Endoscopist:  Dr. CHERISE Ferrari Jr  Complications:  None  Estimated Blood Loss:  None    Surgical assistant: Circulator: Roz Britt, RN  Endoscopy Technician: Nayely Ramsey none unless otherwise specified.     Permit:  The indications, risks, benefits and alternatives were reviewed with the patient or their decision maker who was provided an opportunity to ask questions and all questions were answered.  The specific risks of esophagogastroduodenoscopy with conscious sedation were reviewed, including but not limited to anesthetic complication, bleeding, adverse drug reaction, missed lesion, infection, IV site reactions, and intestinal perforation which would lead to the need for surgical repair.  Alternatives to EGD including radiographic imaging, observation without testing, or laboratory testing were reviewed as well as the limitations of those alternatives discussed.  After considering the options and having all their questions answered, the patient or their decision maker provided both verbal and written consent to proceed.       Procedure in Detail:  After obtaining informed consent, positioning of the patient in the left lateral decubitus position, and conduction of a pre-procedure pause or \"time out\" the endoscope was introduced into the mouth and advanced to the duodenum.  A careful inspection was made, and findings or interventions are described below.    Findings:   Esophagus: LA class C esophagitis. There were mucosal changes that could

## 2025-04-28 NOTE — PROGRESS NOTES
Initial RN admission and assessment performed and documented in Endoscopy navigator.     Patient evaluated by anesthesia in pre-procedure holding.     All procedural vital signs, airway assessment, and level of consciousness information monitored and recorded by anesthesia staff on the anesthesia record.     Report received from CRNA post procedure.  Patient transported to recovery area by RN.    Endoscopy post procedure time out was performed and specimens were verified by physician.    Endoscope was pre-cleaned at bedside immediately following procedure by Angelo RANDALL

## 2025-04-28 NOTE — INTERVAL H&P NOTE
Pre-Endoscopy H&P Update  Chief complaint/HPI/ROS:  The indication for the procedure, the patient's history and the patient's current medications are reviewed prior to the procedure and that data is reported on the H&P to which this document is attached.  Any significant complaints with regard to organ systems will be noted, and if not mentioned then a review of systems is not contributory.  Past Medical History:   Diagnosis Date    Back pain     Migraines       Past Surgical History:   Procedure Laterality Date    ENDOSCOPY, COLON, DIAGNOSTIC      OTHER SURGICAL HISTORY      Nasal bridge fracture     Social   Social History     Tobacco Use    Smoking status: Every Day     Current packs/day: 0.50     Types: Cigarettes    Smokeless tobacco: Never   Substance Use Topics    Alcohol use: Not Currently      Family History   Problem Relation Age of Onset    Diabetes Mother     Kidney Cancer Mother     Prostate Cancer Father       Allergies   Allergen Reactions    Acetaminophen Nausea Only    Bee Venom Swelling    Tramadol Other (See Comments)     Stomach pain.      Prior to Admission Medications   Prescriptions Last Dose Informant Patient Reported? Taking?   DULoxetine (CYMBALTA) 30 MG extended release capsule   Yes No   Sig: Take 1 capsule by mouth daily   Patient not taking: Reported on 4/3/2025   QULIPTA 60 MG TABS Unknown  Yes No   UBRELVY 50 MG TABS Unknown  Yes No   Sig: PLEASE SEE ATTACHED FOR DETAILED DIRECTIONS   Zinc Sulfate 220 (50 Zn) MG TABS   Yes No   Sig: Take by mouth daily   Patient not taking: Reported on 4/3/2025   acetaminophen (TYLENOL) 500 MG tablet   No No   Sig: Take 2 tablets by mouth every 6 hours as needed for Pain   albuterol sulfate HFA (PROVENTIL;VENTOLIN;PROAIR) 108 (90 Base) MCG/ACT inhaler   No No   Sig: Inhale 1 puff into the lungs every 4 hours as needed for Wheezing or Shortness of Breath   cloNIDine (CATAPRES) 0.1 MG tablet 2025  Yes Yes   Si (ONE) TABLET AT BEDTIME INSOMNIA

## 2025-04-28 NOTE — H&P
55 y.o. male presents for diagnostic upper endoscopy for reflux, dysphagia.  Additional H&P data will be attached on the day of procedure.    Ricardo Ferrari Jr, MD

## 2025-04-28 NOTE — INTERVAL H&P NOTE
Pre-Endoscopy H&P Update  Chief complaint/HPI/ROS:  The indication for the procedure, the patient's history and the patient's current medications are reviewed prior to the procedure and that data is reported on the H&P to which this document is attached.  Any significant complaints with regard to organ systems will be noted, and if not mentioned then a review of systems is not contributory.  Past Medical History:   Diagnosis Date    Back pain       Past Surgical History:   Procedure Laterality Date    OTHER SURGICAL HISTORY      Nasal bridge fracture     Social   Social History     Tobacco Use    Smoking status: Every Day     Current packs/day: 0.50     Types: Cigarettes    Smokeless tobacco: Never   Substance Use Topics    Alcohol use: Not Currently      Family History   Problem Relation Age of Onset    Diabetes Mother       Allergies   Allergen Reactions    Acetaminophen Nausea Only    Bee Venom Swelling    Tramadol Other (See Comments)     Stomach pain.      Prior to Admission Medications   Prescriptions Last Dose Informant Patient Reported? Taking?   DULoxetine (CYMBALTA) 30 MG extended release capsule   Yes No   Sig: Take 1 capsule by mouth daily   Patient not taking: Reported on 4/3/2025   Zinc Sulfate 220 (50 Zn) MG TABS   Yes No   Sig: Take by mouth daily   Patient not taking: Reported on 4/3/2025   acetaminophen (TYLENOL) 500 MG tablet   No No   Sig: Take 2 tablets by mouth every 6 hours as needed for Pain   albuterol sulfate HFA (PROVENTIL;VENTOLIN;PROAIR) 108 (90 Base) MCG/ACT inhaler   No No   Sig: Inhale 1 puff into the lungs every 4 hours as needed for Wheezing or Shortness of Breath   cyclobenzaprine (FLEXERIL) 5 MG tablet   Yes No   Sig: Take by mouth 2 times daily   Patient not taking: Reported on 4/3/2025   fluticasone (FLONASE) 50 MCG/ACT nasal spray   No No   Si sprays by Nasal route daily   ibuprofen (IBU) 600 MG tablet   No No   Sig: Take 1 tablet by mouth every 6 hours as needed for Pain

## 2025-04-28 NOTE — ANESTHESIA POSTPROCEDURE EVALUATION
Department of Anesthesiology  Postprocedure Note    Patient: Too Fitch  MRN: [id number]  YOB: 1970  Date of evaluation: 4/28/2025    Procedure Summary       Date: 04/28/25 Room / Location: [facility] ENDO 03 / [facility] ENDOSCOPY    Anesthesia Start: 1045 Anesthesia Stop: 1100    Procedure: ESOPHAGOGASTRODUODENOSCOPY (Upper GI Region) Diagnosis:       Dysphagia, unspecified type      Heartburn      Regurgitation of food      (Dysphagia, unspecified type [R13.10])      (Heartburn [R12])      (Regurgitation of food [R11.10])    Surgeons: Ricardo Ferrari MD Responsible Provider: Noah Pineda MD    Anesthesia Type: MAC ASA Status: 2            Anesthesia Type: No value filed.    Marleni Phase I: Marleni Score: 10    Marleni Phase II: Marleni Score: 10    Anesthesia Post Evaluation    Patient location during evaluation: PACU  Patient participation: complete - patient participated  Level of consciousness: awake  Airway patency: patent  Nausea & Vomiting: no nausea  Cardiovascular status: hemodynamically stable  Respiratory status: acceptable  Hydration status: stable  Pain management: adequate    No notable events documented.

## 2025-05-15 ENCOUNTER — OFFICE VISIT (OUTPATIENT)
Age: 55
End: 2025-05-15
Payer: MEDICAID

## 2025-05-15 VITALS
DIASTOLIC BLOOD PRESSURE: 64 MMHG | RESPIRATION RATE: 16 BRPM | SYSTOLIC BLOOD PRESSURE: 112 MMHG | OXYGEN SATURATION: 99 % | BODY MASS INDEX: 27.7 KG/M2 | HEIGHT: 69 IN | WEIGHT: 187 LBS | HEART RATE: 78 BPM

## 2025-05-15 DIAGNOSIS — G43.719 INTRACTABLE CHRONIC MIGRAINE WITHOUT AURA AND WITHOUT STATUS MIGRAINOSUS: ICD-10-CM

## 2025-05-15 DIAGNOSIS — G43.719 INTRACTABLE CHRONIC MIGRAINE WITHOUT AURA AND WITHOUT STATUS MIGRAINOSUS: Primary | ICD-10-CM

## 2025-05-15 PROCEDURE — 99204 OFFICE O/P NEW MOD 45 MIN: CPT | Performed by: PSYCHIATRY & NEUROLOGY

## 2025-05-15 RX ORDER — CHOLECALCIFEROL (VITAMIN D3) 1250 MCG
50000 CAPSULE ORAL WEEKLY
COMMUNITY
Start: 2025-04-15

## 2025-05-15 RX ORDER — ELETRIPTAN HYDROBROMIDE 40 MG/1
40 TABLET, FILM COATED ORAL PRN
Qty: 6 TABLET | Refills: 3 | Status: SHIPPED | OUTPATIENT
Start: 2025-05-15

## 2025-05-15 RX ORDER — DIVALPROEX SODIUM 250 MG/1
500 TABLET, FILM COATED, EXTENDED RELEASE ORAL DAILY
Qty: 180 TABLET | Refills: 1 | Status: SHIPPED | OUTPATIENT
Start: 2025-05-15

## 2025-05-15 ASSESSMENT — PATIENT HEALTH QUESTIONNAIRE - PHQ9
SUM OF ALL RESPONSES TO PHQ QUESTIONS 1-9: 0
2. FEELING DOWN, DEPRESSED OR HOPELESS: NOT AT ALL
1. LITTLE INTEREST OR PLEASURE IN DOING THINGS: NOT AT ALL
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
## 2025-05-16 ENCOUNTER — RESULTS FOLLOW-UP (OUTPATIENT)
Age: 55
End: 2025-05-16

## 2025-05-16 LAB
ALBUMIN SERPL-MCNC: 4.7 G/DL (ref 3.8–4.9)
ALP SERPL-CCNC: 86 IU/L (ref 44–121)
ALT SERPL-CCNC: 13 IU/L (ref 0–44)
AST SERPL-CCNC: 12 IU/L (ref 0–40)
BILIRUB SERPL-MCNC: 0.4 MG/DL (ref 0–1.2)
BUN SERPL-MCNC: 9 MG/DL (ref 6–24)
BUN/CREAT SERPL: 9 (ref 9–20)
CALCIUM SERPL-MCNC: 9.6 MG/DL (ref 8.7–10.2)
CHLORIDE SERPL-SCNC: 103 MMOL/L (ref 96–106)
CO2 SERPL-SCNC: 24 MMOL/L (ref 20–29)
CREAT SERPL-MCNC: 1.03 MG/DL (ref 0.76–1.27)
EGFRCR SERPLBLD CKD-EPI 2021: 87 ML/MIN/1.73
ERYTHROCYTE [DISTWIDTH] IN BLOOD BY AUTOMATED COUNT: 17 % (ref 11.6–15.4)
GLOBULIN SER CALC-MCNC: 2.8 G/DL (ref 1.5–4.5)
GLUCOSE SERPL-MCNC: 95 MG/DL (ref 70–99)
HCT VFR BLD AUTO: 49.9 % (ref 37.5–51)
HGB BLD-MCNC: 16.3 G/DL (ref 13–17.7)
MCH RBC QN AUTO: 23.5 PG (ref 26.6–33)
MCHC RBC AUTO-ENTMCNC: 32.7 G/DL (ref 31.5–35.7)
MCV RBC AUTO: 72 FL (ref 79–97)
PLATELET # BLD AUTO: 187 X10E3/UL (ref 150–450)
POTASSIUM SERPL-SCNC: 4.4 MMOL/L (ref 3.5–5.2)
PROT SERPL-MCNC: 7.5 G/DL (ref 6–8.5)
RBC # BLD AUTO: 6.93 X10E6/UL (ref 4.14–5.8)
SODIUM SERPL-SCNC: 141 MMOL/L (ref 134–144)
WBC # BLD AUTO: 4.8 X10E3/UL (ref 3.4–10.8)

## 2025-05-16 NOTE — TELEPHONE ENCOUNTER
----- Message from Dr. Jerrica Rayo DO sent at 5/16/2025 10:52 AM EDT -----  Ok to start VPA-labs are stable. RMD  ----- Message -----  From: Libra Mckenzie Incoming Amb Ref Lab Orders To [facility]  Sent: 5/16/2025   7:38 AM EDT  To: Jerrica Rayo DO

## 2025-07-03 ENCOUNTER — HOSPITAL ENCOUNTER (EMERGENCY)
Facility: HOSPITAL | Age: 55
Discharge: HOME | End: 2025-07-03
Attending: EMERGENCY MEDICINE
Payer: MEDICAID

## 2025-07-03 VITALS
SYSTOLIC BLOOD PRESSURE: 133 MMHG | TEMPERATURE: 97.7 F | RESPIRATION RATE: 18 BRPM | HEART RATE: 80 BPM | BODY MASS INDEX: 28.31 KG/M2 | WEIGHT: 191.14 LBS | DIASTOLIC BLOOD PRESSURE: 86 MMHG | OXYGEN SATURATION: 92 % | HEIGHT: 69 IN

## 2025-07-03 DIAGNOSIS — K08.89 PAIN, DENTAL: Primary | ICD-10-CM

## 2025-07-03 PROCEDURE — 6370000000 HC RX 637 (ALT 250 FOR IP)

## 2025-07-03 PROCEDURE — 99283 EMERGENCY DEPT VISIT LOW MDM: CPT

## 2025-07-03 RX ORDER — HYDROCODONE BITARTRATE AND ACETAMINOPHEN 5; 325 MG/1; MG/1
1 TABLET ORAL
Refills: 0 | Status: COMPLETED | OUTPATIENT
Start: 2025-07-03 | End: 2025-07-03

## 2025-07-03 RX ADMIN — HYDROCODONE BITARTRATE AND ACETAMINOPHEN 1 TABLET: 5; 325 TABLET ORAL at 17:32

## 2025-07-03 RX ADMIN — DIPHENHYDRAMINE HYDROCHLORIDE: 12.5 SOLUTION ORAL at 17:33

## 2025-07-03 ASSESSMENT — PAIN DESCRIPTION - PAIN TYPE: TYPE: ACUTE PAIN

## 2025-07-03 ASSESSMENT — PAIN SCALES - GENERAL
PAINLEVEL_OUTOF10: 10
PAINLEVEL_OUTOF10: 8

## 2025-07-03 ASSESSMENT — PAIN DESCRIPTION - ONSET: ONSET: ON-GOING

## 2025-07-03 ASSESSMENT — PAIN DESCRIPTION - ORIENTATION
ORIENTATION: RIGHT
ORIENTATION: RIGHT

## 2025-07-03 ASSESSMENT — PAIN DESCRIPTION - DESCRIPTORS
DESCRIPTORS: ACHING;THROBBING
DESCRIPTORS: OTHER (COMMENT)

## 2025-07-03 ASSESSMENT — PAIN DESCRIPTION - LOCATION
LOCATION: MOUTH
LOCATION: TEETH

## 2025-07-03 ASSESSMENT — PAIN DESCRIPTION - FREQUENCY: FREQUENCY: CONTINUOUS

## 2025-07-03 NOTE — ED PROVIDER NOTES
General: Normal range of motion.      Cervical back: Neck supple.   Skin:     General: Skin is warm and dry.   Neurological:      Mental Status: He is alert and oriented to person, place, and time.   Psychiatric:         Mood and Affect: Mood normal.         Behavior: Behavior normal.         DIAGNOSTIC RESULTS     EKG: All EKG's are interpreted by the Emergency Department Physician who either signs or Co-signs this chart in the absence of a cardiologist.        RADIOLOGY:   Non-plain film images such as CT, Ultrasound and MRI are read by the radiologist. Plain radiographic images are visualized and preliminarily interpreted by the emergency physician with the below findings:        Interpretation per the Radiologist below, if available at the time of this note:    No orders to display        LABS:  Labs Reviewed - No data to display    All other labs were within normal range or not returned as of this dictation.    EMERGENCY DEPARTMENT COURSE and DIFFERENTIAL DIAGNOSIS/MDM:   Vitals:    Vitals:    07/03/25 1622 07/03/25 1625 07/03/25 1732   BP: (!) 148/99     Pulse: 85     Resp: 18  17   Temp:  98.4 °F (36.9 °C)    TempSrc:  Oral    SpO2: 100%     Weight: 86.7 kg (191 lb 2.2 oz)     Height: 1.753 m (5' 9\")             Medical Decision Making  55-year-old male presenting with right-sided tooth pain for the past 4 days.  Differentials including but not limited to dentalgia, dental abscess, gingivitis, among others.    Physical exam revealing well-appearing male, no acute distress.  No facial swelling or significant drainage from mouth to suggest abscess.  Does have some erythema and mild gingival swelling suggestive of gingivitis.  Patient given dose of pain medication as well as dental balls while in the ED and will send home with prescription of Augmentin until patient is able to get into see his dentist after the weekend.  He was advised to return to the ED with any new or worsening symptoms.  He is agreeable and

## 2025-07-03 NOTE — DISCHARGE INSTRUCTIONS
Please follow up with your dentist for further evaluation and management of your dental pain. If your symptoms change or worsen please do not hesitate to return to the ED.

## 2025-07-03 NOTE — ED TRIAGE NOTES
Pt ambulatory to ED w/ c/o R. Sided dental pain. Pt states he has tooth that was capped by dentist but having worsening pain and states \"I just know it is infected\".

## 2025-07-28 ENCOUNTER — HOSPITAL ENCOUNTER (EMERGENCY)
Facility: HOSPITAL | Age: 55
Discharge: HOME | End: 2025-07-28
Attending: EMERGENCY MEDICINE
Payer: MEDICAID

## 2025-07-28 ENCOUNTER — APPOINTMENT (OUTPATIENT)
Facility: HOSPITAL | Age: 55
End: 2025-07-28
Payer: MEDICAID

## 2025-07-28 VITALS
TEMPERATURE: 97.8 F | RESPIRATION RATE: 16 BRPM | DIASTOLIC BLOOD PRESSURE: 89 MMHG | SYSTOLIC BLOOD PRESSURE: 122 MMHG | OXYGEN SATURATION: 99 % | HEART RATE: 73 BPM | BODY MASS INDEX: 28.44 KG/M2 | WEIGHT: 192 LBS | HEIGHT: 69 IN

## 2025-07-28 DIAGNOSIS — R20.2 PARESTHESIA: ICD-10-CM

## 2025-07-28 DIAGNOSIS — M54.16 LUMBAR RADICULOPATHY: Primary | ICD-10-CM

## 2025-07-28 DIAGNOSIS — M79.602 LEFT ARM PAIN: ICD-10-CM

## 2025-07-28 LAB
ALBUMIN SERPL-MCNC: 3.8 G/DL (ref 3.5–5)
ALBUMIN/GLOB SERPL: 1.2 (ref 1.1–2.2)
ALP SERPL-CCNC: 84 U/L (ref 45–117)
ALT SERPL-CCNC: 42 U/L (ref 12–78)
ANION GAP SERPL CALC-SCNC: 7 MMOL/L (ref 2–12)
AST SERPL-CCNC: 19 U/L (ref 15–37)
BASOPHILS # BLD: 0.03 K/UL (ref 0–0.1)
BASOPHILS NFR BLD: 0.7 % (ref 0–1)
BILIRUB SERPL-MCNC: 0.4 MG/DL (ref 0.2–1)
BUN SERPL-MCNC: 14 MG/DL (ref 6–20)
BUN/CREAT SERPL: 12 (ref 12–20)
CALCIUM SERPL-MCNC: 8.5 MG/DL (ref 8.5–10.1)
CHLORIDE SERPL-SCNC: 106 MMOL/L (ref 97–108)
CK SERPL-CCNC: 139 U/L (ref 39–308)
CO2 SERPL-SCNC: 28 MMOL/L (ref 21–32)
CREAT SERPL-MCNC: 1.18 MG/DL (ref 0.7–1.3)
DIFFERENTIAL METHOD BLD: ABNORMAL
EOSINOPHIL # BLD: 0.09 K/UL (ref 0–0.4)
EOSINOPHIL NFR BLD: 2 % (ref 0–7)
ERYTHROCYTE [DISTWIDTH] IN BLOOD BY AUTOMATED COUNT: 17.1 % (ref 11.5–14.5)
ERYTHROCYTE [SEDIMENTATION RATE] IN BLOOD: 2 MM/HR (ref 0–20)
GLOBULIN SER CALC-MCNC: 3.3 G/DL (ref 2–4)
GLUCOSE SERPL-MCNC: 118 MG/DL (ref 65–100)
HCT VFR BLD AUTO: 45.5 % (ref 36.6–50.3)
HGB BLD-MCNC: 15 G/DL (ref 12.1–17)
IMM GRANULOCYTES # BLD AUTO: 0.02 K/UL (ref 0–0.04)
IMM GRANULOCYTES NFR BLD AUTO: 0.4 % (ref 0–0.5)
LYMPHOCYTES # BLD: 1.87 K/UL (ref 0.8–3.5)
LYMPHOCYTES NFR BLD: 41 % (ref 12–49)
MCH RBC QN AUTO: 23.6 PG (ref 26–34)
MCHC RBC AUTO-ENTMCNC: 33 G/DL (ref 30–36.5)
MCV RBC AUTO: 71.7 FL (ref 80–99)
MONOCYTES # BLD: 0.44 K/UL (ref 0–1)
MONOCYTES NFR BLD: 9.6 % (ref 5–13)
NEUTS SEG # BLD: 2.11 K/UL (ref 1.8–8)
NEUTS SEG NFR BLD: 46.3 % (ref 32–75)
NRBC # BLD: 0 K/UL (ref 0–0.01)
NRBC BLD-RTO: 0 PER 100 WBC
PLATELET # BLD AUTO: 169 K/UL (ref 150–400)
PMV BLD AUTO: 10.7 FL (ref 8.9–12.9)
POTASSIUM SERPL-SCNC: 4.5 MMOL/L (ref 3.5–5.1)
PROT SERPL-MCNC: 7.1 G/DL (ref 6.4–8.2)
RBC # BLD AUTO: 6.35 M/UL (ref 4.1–5.7)
SODIUM SERPL-SCNC: 141 MMOL/L (ref 136–145)
TROPONIN I SERPL HS-MCNC: <4 NG/L (ref 0–76)
URATE SERPL-MCNC: 4.7 MG/DL (ref 3.5–7.2)
WBC # BLD AUTO: 4.6 K/UL (ref 4.1–11.1)

## 2025-07-28 PROCEDURE — 72100 X-RAY EXAM L-S SPINE 2/3 VWS: CPT

## 2025-07-28 PROCEDURE — 36415 COLL VENOUS BLD VENIPUNCTURE: CPT

## 2025-07-28 PROCEDURE — 84550 ASSAY OF BLOOD/URIC ACID: CPT

## 2025-07-28 PROCEDURE — 80053 COMPREHEN METABOLIC PANEL: CPT

## 2025-07-28 PROCEDURE — 6360000002 HC RX W HCPCS: Performed by: NURSE PRACTITIONER

## 2025-07-28 PROCEDURE — 85652 RBC SED RATE AUTOMATED: CPT

## 2025-07-28 PROCEDURE — 99284 EMERGENCY DEPT VISIT MOD MDM: CPT

## 2025-07-28 PROCEDURE — 85025 COMPLETE CBC W/AUTO DIFF WBC: CPT

## 2025-07-28 PROCEDURE — 82550 ASSAY OF CK (CPK): CPT

## 2025-07-28 PROCEDURE — 96372 THER/PROPH/DIAG INJ SC/IM: CPT

## 2025-07-28 PROCEDURE — 84484 ASSAY OF TROPONIN QUANT: CPT

## 2025-07-28 PROCEDURE — 70450 CT HEAD/BRAIN W/O DYE: CPT

## 2025-07-28 PROCEDURE — 6370000000 HC RX 637 (ALT 250 FOR IP): Performed by: NURSE PRACTITIONER

## 2025-07-28 RX ORDER — BUTALBITAL, ACETAMINOPHEN AND CAFFEINE 50; 325; 40 MG/1; MG/1; MG/1
1 TABLET ORAL EVERY 4 HOURS PRN
COMMUNITY

## 2025-07-28 RX ORDER — DEXAMETHASONE SODIUM PHOSPHATE 4 MG/ML
4 INJECTION, SOLUTION INTRA-ARTICULAR; INTRALESIONAL; INTRAMUSCULAR; INTRAVENOUS; SOFT TISSUE ONCE
Status: COMPLETED | OUTPATIENT
Start: 2025-07-28 | End: 2025-07-28

## 2025-07-28 RX ORDER — PREDNISONE 50 MG/1
50 TABLET ORAL DAILY
Qty: 5 TABLET | Refills: 0 | Status: SHIPPED | OUTPATIENT
Start: 2025-07-28 | End: 2025-08-02

## 2025-07-28 RX ORDER — METHOCARBAMOL 750 MG/1
750 TABLET, FILM COATED ORAL 4 TIMES DAILY
Qty: 40 TABLET | Refills: 0 | Status: SHIPPED | OUTPATIENT
Start: 2025-07-28 | End: 2025-08-07

## 2025-07-28 RX ORDER — LIDOCAINE 50 MG/G
1 PATCH TOPICAL DAILY
Qty: 10 PATCH | Refills: 0 | Status: SHIPPED | OUTPATIENT
Start: 2025-07-28 | End: 2025-08-07

## 2025-07-28 RX ORDER — METHOCARBAMOL 500 MG/1
500 TABLET, FILM COATED ORAL ONCE
Status: COMPLETED | OUTPATIENT
Start: 2025-07-28 | End: 2025-07-28

## 2025-07-28 RX ADMIN — METHOCARBAMOL 500 MG: 500 TABLET ORAL at 12:16

## 2025-07-28 RX ADMIN — DEXAMETHASONE SODIUM PHOSPHATE 4 MG: 4 INJECTION, SOLUTION INTRAMUSCULAR; INTRAVENOUS at 12:17

## 2025-07-28 ASSESSMENT — ENCOUNTER SYMPTOMS
VOMITING: 0
DIARRHEA: 0
ABDOMINAL PAIN: 0
WHEEZING: 0
SHORTNESS OF BREATH: 0
CONSTIPATION: 0
COUGH: 0
NAUSEA: 0
SORE THROAT: 0
RHINORRHEA: 0
BACK PAIN: 1

## 2025-07-28 ASSESSMENT — PAIN - FUNCTIONAL ASSESSMENT
PAIN_FUNCTIONAL_ASSESSMENT: 0-10
PAIN_FUNCTIONAL_ASSESSMENT: 0-10

## 2025-07-28 ASSESSMENT — PAIN DESCRIPTION - PAIN TYPE: TYPE: ACUTE PAIN

## 2025-07-28 ASSESSMENT — PAIN DESCRIPTION - DESCRIPTORS
DESCRIPTORS: SHARP
DESCRIPTORS: ACHING
DESCRIPTORS: SHARP

## 2025-07-28 ASSESSMENT — PAIN DESCRIPTION - ORIENTATION
ORIENTATION: LEFT
ORIENTATION: LOWER;LEFT
ORIENTATION: LEFT

## 2025-07-28 ASSESSMENT — PAIN DESCRIPTION - LOCATION
LOCATION: BACK

## 2025-07-28 ASSESSMENT — PAIN SCALES - GENERAL
PAINLEVEL_OUTOF10: 10
PAINLEVEL_OUTOF10: 10
PAINLEVEL_OUTOF10: 8

## 2025-07-28 NOTE — ED NOTES
Discharge instructions were given to the patient by YOGESH Moreno.  The patient left the Emergency Department alert and oriented and in no acute distress with 3 prescription(s). The patient was encouraged to call or return to the ED for worsening issues or problems and was encouraged to schedule a follow up appointment for continuing care.   The patient verbalized understanding of discharge instructions and prescriptions; all questions were answered. The patient has no further concerns at this time.

## 2025-07-28 NOTE — ED PROVIDER NOTES
[facility] EMERGENCY DEPARTMENT  EMERGENCY DEPARTMENT ENCOUNTER       Pt Name: Too Fitch  MRN: [id number]  Birthdate 1970  Date of evaluation: 7/28/2025  Provider: CLARE Alfonso NP   PCP: Marquez Mcbride FNP  Note Started: 11:33 AM EDT 7/28/25     CHIEF COMPLAINT       Chief Complaint   Patient presents with    Back Pain        HISTORY OF PRESENT ILLNESS: 1 or more elements      History From: Patient  HPI Limitations: None     Too Fitch is a 55 y.o. male who presents with back pain.  Onset 1 to 2 weeks ago.  Patient states symptoms began after laying in a couch abnormally.  States he was mostly on his left side and when he awoke his left arm and left leg was numb.  He has experienced intermittent numbness and tingling since then.  Also reports left arm and left lower back pain.  States there has been swelling around the ankle but no pain.  Denies chest pain, shortness of breath, headache, dizziness.  Patient states he has a history of scad occur that is intermittent but states the symptoms are worse than usual.  Reports taking Tylenol and Motrin with no relief.  Patient is concerned that there is something wrong however he is unsure of his last image of his back.  History of migraines and currently being followed by neurology which he states is controlled.  Denies changes in speech, facial changes, unsteady gait.     Nursing Notes were all reviewed and agreed with or any disagreements were addressed in the HPI.     REVIEW OF SYSTEMS      Review of Systems   Constitutional:  Negative for chills, fatigue and fever.   HENT:  Negative for congestion, ear pain, rhinorrhea and sore throat.    Respiratory:  Negative for cough, shortness of breath and wheezing.    Cardiovascular:  Negative for chest pain.   Gastrointestinal:  Negative for abdominal pain, constipation, diarrhea, nausea and vomiting.   Genitourinary:  Negative for frequency and urgency.   Musculoskeletal:

## 2025-07-28 NOTE — ED NOTES
Pt presents to ED complaining of 10/10 lower back pain that radiates down the left leg and up to the left shoulder blade x1 day. Pt also endorsing swelling of the left leg x1 day, though states it has gotten better.    Pt endorses taking Ibuprofen with minimal relief.     Pt endorses hx of Sciatica    Pt is alert and oriented x 4, RR even and unlabored, skin is warm and dry. Pt appears in NAD at this time. Assessment completed and pt updated on plan of care.  Call bell in reach.  Emergency Department Nursing Plan of Care  The Nursing Plan of Care is developed from the Nursing assessment and Emergency Department Attending provider initial evaluation.  The plan of care may be reviewed in the “ED Provider note”.  The Plan of Care was developed with the following considerations:  Patient / Family readiness to learn indicated by:Refer to Medical chart in [facility]  Persons(s) to be included in education: Refer to Medical chart in [facility]  Barriers to Learning/Limitations:Normal

## 2025-07-28 NOTE — ED TRIAGE NOTES
Pt reports lower back pain that radiates down the left leg with some numbness, pain, swelling to left leg. Pt reports sleeping on a cough about 1.5 weeks ago. Pt reports taking ibuprofen without relief

## 2025-07-28 NOTE — DISCHARGE INSTRUCTIONS
It was a pleasure taking care of you at [facility] Emergency Department today.  We know that when you come to [facility], you are entrusting us with your health, comfort, and safety.  Our physicians and nurses honor that trust, and we truly appreciate the opportunity to care for you and your loved ones.      We also value our feedback.  If you receive a survey about your Emergency Department experience today, please fill it out.  We care about our patients' feedback, and we listen to what you have to say.  Thank you!

## 2025-08-03 ENCOUNTER — APPOINTMENT (OUTPATIENT)
Facility: HOSPITAL | Age: 55
End: 2025-08-03
Payer: MEDICAID

## 2025-08-03 ENCOUNTER — HOSPITAL ENCOUNTER (EMERGENCY)
Facility: HOSPITAL | Age: 55
Discharge: HOME | End: 2025-08-03
Attending: STUDENT IN AN ORGANIZED HEALTH CARE EDUCATION/TRAINING PROGRAM
Payer: MEDICAID

## 2025-08-03 VITALS
HEIGHT: 69 IN | WEIGHT: 194 LBS | TEMPERATURE: 97.9 F | SYSTOLIC BLOOD PRESSURE: 121 MMHG | OXYGEN SATURATION: 99 % | BODY MASS INDEX: 28.73 KG/M2 | DIASTOLIC BLOOD PRESSURE: 79 MMHG | HEART RATE: 64 BPM | RESPIRATION RATE: 16 BRPM

## 2025-08-03 DIAGNOSIS — M79.662 PAIN OF LEFT CALF: Primary | ICD-10-CM

## 2025-08-03 DIAGNOSIS — M54.16 LUMBAR RADICULOPATHY: ICD-10-CM

## 2025-08-03 PROCEDURE — 6360000002 HC RX W HCPCS: Performed by: PHYSICIAN ASSISTANT

## 2025-08-03 PROCEDURE — 6370000000 HC RX 637 (ALT 250 FOR IP): Performed by: PHYSICIAN ASSISTANT

## 2025-08-03 PROCEDURE — 93971 EXTREMITY STUDY: CPT

## 2025-08-03 PROCEDURE — 99284 EMERGENCY DEPT VISIT MOD MDM: CPT

## 2025-08-03 PROCEDURE — 96372 THER/PROPH/DIAG INJ SC/IM: CPT

## 2025-08-03 RX ORDER — HYDROCODONE BITARTRATE AND ACETAMINOPHEN 5; 325 MG/1; MG/1
1 TABLET ORAL
Refills: 0 | Status: COMPLETED | OUTPATIENT
Start: 2025-08-03 | End: 2025-08-03

## 2025-08-03 RX ORDER — CYCLOBENZAPRINE HCL 10 MG
10 TABLET ORAL 3 TIMES DAILY PRN
Qty: 15 TABLET | Refills: 0 | Status: SHIPPED | OUTPATIENT
Start: 2025-08-03

## 2025-08-03 RX ORDER — KETOROLAC TROMETHAMINE 30 MG/ML
30 INJECTION, SOLUTION INTRAMUSCULAR; INTRAVENOUS
Status: COMPLETED | OUTPATIENT
Start: 2025-08-03 | End: 2025-08-03

## 2025-08-03 RX ORDER — HYDROCODONE BITARTRATE AND ACETAMINOPHEN 5; 325 MG/1; MG/1
1 TABLET ORAL EVERY 8 HOURS PRN
Qty: 9 TABLET | Refills: 0 | Status: SHIPPED | OUTPATIENT
Start: 2025-08-03 | End: 2025-08-06

## 2025-08-03 RX ORDER — CYCLOBENZAPRINE HCL 10 MG
10 TABLET ORAL ONCE
Status: COMPLETED | OUTPATIENT
Start: 2025-08-03 | End: 2025-08-03

## 2025-08-03 RX ADMIN — KETOROLAC TROMETHAMINE 30 MG: 30 INJECTION, SOLUTION INTRAMUSCULAR; INTRAVENOUS at 10:44

## 2025-08-03 RX ADMIN — HYDROCODONE BITARTRATE AND ACETAMINOPHEN 1 TABLET: 5; 325 TABLET ORAL at 10:44

## 2025-08-03 RX ADMIN — CYCLOBENZAPRINE 10 MG: 10 TABLET, FILM COATED ORAL at 10:44

## 2025-08-03 ASSESSMENT — PAIN DESCRIPTION - ONSET: ONSET: ON-GOING

## 2025-08-03 ASSESSMENT — PAIN - FUNCTIONAL ASSESSMENT
PAIN_FUNCTIONAL_ASSESSMENT: 0-10
PAIN_FUNCTIONAL_ASSESSMENT: ACTIVITIES ARE NOT PREVENTED
PAIN_FUNCTIONAL_ASSESSMENT: 0-10

## 2025-08-03 ASSESSMENT — PAIN SCALES - GENERAL
PAINLEVEL_OUTOF10: 10
PAINLEVEL_OUTOF10: 9
PAINLEVEL_OUTOF10: 10

## 2025-08-03 ASSESSMENT — PAIN DESCRIPTION - PAIN TYPE: TYPE: ACUTE PAIN

## 2025-08-03 ASSESSMENT — PAIN DESCRIPTION - DESCRIPTORS: DESCRIPTORS: ACHING

## 2025-08-03 ASSESSMENT — PAIN DESCRIPTION - LOCATION: LOCATION: BACK

## 2025-08-03 ASSESSMENT — PAIN DESCRIPTION - ORIENTATION: ORIENTATION: LOWER

## 2025-08-03 ASSESSMENT — PAIN DESCRIPTION - FREQUENCY: FREQUENCY: CONTINUOUS

## 2025-08-09 LAB — ECHO BSA: 2.07 M2

## (undated) DEVICE — FORCEP BX JUMBO 4 2.8 MMX240 CM 3.2 MM OVL CUP RADIAL JAW

## (undated) DEVICE — ORISE PROKNIFE 1.5 MM ELECTRODE: Brand: ORISE™ PROKNIFE

## (undated) DEVICE — ORISE PROKNIFE 3.0 MM ELECTRODE: Brand: ORISE™ PROKNIFE